# Patient Record
Sex: FEMALE | Race: WHITE | NOT HISPANIC OR LATINO | Employment: FULL TIME | URBAN - METROPOLITAN AREA
[De-identification: names, ages, dates, MRNs, and addresses within clinical notes are randomized per-mention and may not be internally consistent; named-entity substitution may affect disease eponyms.]

---

## 2018-06-26 ENCOUNTER — OFFICE VISIT - HEALTHEAST (OUTPATIENT)
Dept: FAMILY MEDICINE | Facility: CLINIC | Age: 58
End: 2018-06-26

## 2018-06-26 ENCOUNTER — COMMUNICATION - HEALTHEAST (OUTPATIENT)
Dept: TELEHEALTH | Facility: CLINIC | Age: 58
End: 2018-06-26

## 2018-06-26 DIAGNOSIS — Z12.31 SCREENING MAMMOGRAM, ENCOUNTER FOR: ICD-10-CM

## 2018-06-26 DIAGNOSIS — Z00.00 ROUTINE GENERAL MEDICAL EXAMINATION AT A HEALTH CARE FACILITY: ICD-10-CM

## 2018-06-26 DIAGNOSIS — Z12.4 PAP SMEAR FOR CERVICAL CANCER SCREENING: ICD-10-CM

## 2018-06-26 DIAGNOSIS — Z23 NEED FOR VACCINATION: ICD-10-CM

## 2018-06-26 DIAGNOSIS — D50.9 IRON DEFICIENCY ANEMIA: ICD-10-CM

## 2018-06-26 DIAGNOSIS — Z98.84 HISTORY OF GASTRIC BYPASS: ICD-10-CM

## 2018-06-26 DIAGNOSIS — L65.9 HAIR LOSS: ICD-10-CM

## 2018-06-26 LAB
ALBUMIN SERPL-MCNC: 3.7 G/DL (ref 3.5–5)
ALP SERPL-CCNC: 56 U/L (ref 45–120)
ALT SERPL W P-5'-P-CCNC: 13 U/L (ref 0–45)
ANION GAP SERPL CALCULATED.3IONS-SCNC: 9 MMOL/L (ref 5–18)
AST SERPL W P-5'-P-CCNC: 18 U/L (ref 0–40)
BASOPHILS # BLD AUTO: 0 THOU/UL (ref 0–0.2)
BASOPHILS NFR BLD AUTO: 1 % (ref 0–2)
BILIRUB SERPL-MCNC: 0.4 MG/DL (ref 0–1)
BUN SERPL-MCNC: 9 MG/DL (ref 8–22)
CALCIUM SERPL-MCNC: 8.8 MG/DL (ref 8.5–10.5)
CHLORIDE BLD-SCNC: 110 MMOL/L (ref 98–107)
CHOLEST SERPL-MCNC: 129 MG/DL
CO2 SERPL-SCNC: 23 MMOL/L (ref 22–31)
CREAT SERPL-MCNC: 0.66 MG/DL (ref 0.6–1.1)
EOSINOPHIL COUNT (ABSOLUTE): 0 THOU/UL (ref 0–0.4)
EOSINOPHIL NFR BLD AUTO: 1 % (ref 0–6)
ERYTHROCYTE [DISTWIDTH] IN BLOOD BY AUTOMATED COUNT: 24.1 % (ref 11–14.5)
FASTING STATUS PATIENT QL REPORTED: YES
FOLATE SERPL-MCNC: 14.6 NG/ML
GFR SERPL CREATININE-BSD FRML MDRD: >60 ML/MIN/1.73M2
GLUCOSE BLD-MCNC: 79 MG/DL (ref 70–125)
HBA1C MFR BLD: 4.8 % (ref 3.5–6)
HCT VFR BLD AUTO: 25.6 % (ref 35–47)
HDLC SERPL-MCNC: 61 MG/DL
HGB BLD-MCNC: 6.2 G/DL (ref 12–16)
IRON SATN MFR SERPL: 2 % (ref 20–50)
IRON SERPL-MCNC: 9 UG/DL (ref 42–175)
LDLC SERPL CALC-MCNC: 58 MG/DL
LYMPHOCYTES # BLD AUTO: 0.9 THOU/UL (ref 0.8–4.4)
LYMPHOCYTES NFR BLD AUTO: 20 % (ref 20–40)
MCH RBC QN AUTO: 16.2 PG (ref 27–34)
MCHC RBC AUTO-ENTMCNC: 24.2 G/DL (ref 32–36)
MCV RBC AUTO: 67 FL (ref 80–100)
MONOCYTES # BLD AUTO: 0.2 THOU/UL (ref 0–0.9)
MONOCYTES NFR BLD AUTO: 4 % (ref 2–10)
OVALOCYTES: ABNORMAL
PLAT MORPH BLD: ABNORMAL
PLATELET # BLD AUTO: 615 THOU/UL (ref 140–440)
PMV BLD AUTO: 10.3 FL (ref 8.5–12.5)
POTASSIUM BLD-SCNC: 4.3 MMOL/L (ref 3.5–5)
PROT SERPL-MCNC: 6.2 G/DL (ref 6–8)
RBC # BLD AUTO: 3.82 MILL/UL (ref 3.8–5.4)
SODIUM SERPL-SCNC: 142 MMOL/L (ref 136–145)
TIBC SERPL-MCNC: 441 UG/DL (ref 313–563)
TOTAL NEUTROPHILS-ABS(DIFF): 3.4 THOU/UL (ref 2–7.7)
TOTAL NEUTROPHILS-REL(DIFF): 75 % (ref 50–70)
TRANSFERRIN SERPL-MCNC: 353 MG/DL (ref 212–360)
TRIGL SERPL-MCNC: 51 MG/DL
TSH SERPL DL<=0.005 MIU/L-ACNC: 2.11 UIU/ML (ref 0.3–5)
VIT B12 SERPL-MCNC: 306 PG/ML (ref 213–816)
WBC: 4.5 THOU/UL (ref 4–11)

## 2018-06-26 ASSESSMENT — MIFFLIN-ST. JEOR: SCORE: 1313.44

## 2018-06-26 NOTE — ASSESSMENT & PLAN NOTE
Patient has no symptoms consistent with acute blood loss. The patient had a gastric bypass in 2011.  She is taking a daily children's vitamin with iron.  She had a normal colonoscopy in 2012 and is not having any bowel symptoms.  I suspect her iron deficiency is related to diet and poor absorption.  We will start ferrous sulfate solution 300 mg daily.  Recheck with lab and office visit in 3 weeks.  If not improving, we will needto consider repeat colonoscopy.  May also need to consider IV iron if not improving.

## 2018-06-27 LAB
25(OH)D3 SERPL-MCNC: 29.8 NG/ML (ref 30–80)
25(OH)D3 SERPL-MCNC: 29.8 NG/ML (ref 30–80)
HPV SOURCE: NORMAL
HUMAN PAPILLOMA VIRUS 16 DNA: NEGATIVE
HUMAN PAPILLOMA VIRUS 18 DNA: NEGATIVE
HUMAN PAPILLOMA VIRUS FINAL DIAGNOSIS: NORMAL
HUMAN PAPILLOMA VIRUS OTHER HR: NEGATIVE
SPECIMEN DESCRIPTION: NORMAL

## 2018-06-29 ENCOUNTER — COMMUNICATION - HEALTHEAST (OUTPATIENT)
Dept: FAMILY MEDICINE | Facility: CLINIC | Age: 58
End: 2018-06-29

## 2018-06-29 DIAGNOSIS — D50.9 IRON DEFICIENCY ANEMIA: ICD-10-CM

## 2018-07-02 ENCOUNTER — HOSPITAL ENCOUNTER (OUTPATIENT)
Dept: MAMMOGRAPHY | Facility: CLINIC | Age: 58
Discharge: HOME | End: 2018-07-02
Attending: FAMILY MEDICINE

## 2018-07-02 DIAGNOSIS — Z12.31 SCREENING MAMMOGRAM, ENCOUNTER FOR: ICD-10-CM

## 2018-07-03 LAB
BKR LAB AP ABNORMAL BLEEDING: NO
BKR LAB AP BIRTH CONTROL/HORMONES: NORMAL
BKR LAB AP CERVICAL APPEARANCE: NORMAL
BKR LAB AP GYN ADEQUACY: NORMAL
BKR LAB AP GYN INTERPRETATION: NORMAL
BKR LAB AP HPV REFLEX: NORMAL
BKR LAB AP LMP: NORMAL
BKR LAB AP PATIENT STATUS: NORMAL
BKR LAB AP PREVIOUS ABNORMAL: NORMAL
BKR LAB AP PREVIOUS NORMAL: 2012
HIGH RISK?: NO
PATH REPORT.COMMENTS IMP SPEC: NORMAL
RESULT FLAG (HE HISTORICAL CONVERSION): NORMAL

## 2019-11-19 ENCOUNTER — COMMUNICATION - HEALTHEAST (OUTPATIENT)
Dept: FAMILY MEDICINE | Facility: CLINIC | Age: 59
End: 2019-11-19

## 2019-11-26 ENCOUNTER — OFFICE VISIT - HEALTHEAST (OUTPATIENT)
Dept: FAMILY MEDICINE | Facility: CLINIC | Age: 59
End: 2019-11-26

## 2019-11-26 DIAGNOSIS — Z98.84 HISTORY OF GASTRIC BYPASS: ICD-10-CM

## 2019-11-26 DIAGNOSIS — Z01.818 PREOP EXAMINATION: ICD-10-CM

## 2019-11-26 DIAGNOSIS — Z86.2 HISTORY OF IRON DEFICIENCY ANEMIA: ICD-10-CM

## 2019-11-26 LAB
ERYTHROCYTE [DISTWIDTH] IN BLOOD BY AUTOMATED COUNT: 17.1 % (ref 11–14.5)
HCT VFR BLD AUTO: 38.2 % (ref 35–47)
HGB BLD-MCNC: 11.9 G/DL (ref 12–16)
IRON SATN MFR SERPL: 4 % (ref 20–50)
IRON SERPL-MCNC: 17 UG/DL (ref 42–175)
MCH RBC QN AUTO: 21.8 PG (ref 27–34)
MCHC RBC AUTO-ENTMCNC: 31.1 G/DL (ref 32–36)
MCV RBC AUTO: 70 FL (ref 80–100)
PLATELET # BLD AUTO: 326 THOU/UL (ref 140–440)
PMV BLD AUTO: 8.1 FL (ref 7–10)
RBC # BLD AUTO: 5.45 MILL/UL (ref 3.8–5.4)
TIBC SERPL-MCNC: 392 UG/DL (ref 313–563)
TRANSFERRIN SERPL-MCNC: 314 MG/DL (ref 212–360)
WBC: 4 THOU/UL (ref 4–11)

## 2019-11-26 ASSESSMENT — MIFFLIN-ST. JEOR: SCORE: 1357.78

## 2019-11-26 NOTE — ASSESSMENT & PLAN NOTE
Hemoglobin much improved at 11.9. Patient cleared for surgery. Iron studies pending. Daily iron supplement recommended given history ofgastric bypass.

## 2019-11-27 LAB
ATRIAL RATE - MUSE: 64 BPM
DIASTOLIC BLOOD PRESSURE - MUSE: NORMAL
INTERPRETATION ECG - MUSE: NORMAL
P AXIS - MUSE: 47 DEGREES
PR INTERVAL - MUSE: 194 MS
QRS DURATION - MUSE: 82 MS
QT - MUSE: 432 MS
QTC - MUSE: 445 MS
R AXIS - MUSE: 33 DEGREES
SYSTOLIC BLOOD PRESSURE - MUSE: NORMAL
T AXIS - MUSE: 36 DEGREES
VENTRICULAR RATE- MUSE: 64 BPM

## 2021-01-28 ENCOUNTER — COMMUNICATION - HEALTHEAST (OUTPATIENT)
Dept: FAMILY MEDICINE | Facility: CLINIC | Age: 61
End: 2021-01-28

## 2021-03-04 ENCOUNTER — COMMUNICATION - HEALTHEAST (OUTPATIENT)
Dept: FAMILY MEDICINE | Facility: CLINIC | Age: 61
End: 2021-03-04

## 2021-03-04 DIAGNOSIS — Z20.822 ENCOUNTER FOR LABORATORY TESTING FOR COVID-19 VIRUS: ICD-10-CM

## 2021-03-08 ENCOUNTER — AMBULATORY - HEALTHEAST (OUTPATIENT)
Dept: FAMILY MEDICINE | Facility: CLINIC | Age: 61
End: 2021-03-08

## 2021-03-08 DIAGNOSIS — Z20.822 ENCOUNTER FOR LABORATORY TESTING FOR COVID-19 VIRUS: ICD-10-CM

## 2021-03-08 LAB
SARS-COV-2 PCR COMMENT: NORMAL
SARS-COV-2 RNA SPEC QL NAA+PROBE: NEGATIVE
SARS-COV-2 VIRUS SPECIMEN SOURCE: NORMAL

## 2021-03-09 ENCOUNTER — COMMUNICATION - HEALTHEAST (OUTPATIENT)
Dept: SCHEDULING | Facility: CLINIC | Age: 61
End: 2021-03-09

## 2021-06-01 VITALS — WEIGHT: 172 LBS | BODY MASS INDEX: 31.65 KG/M2 | HEIGHT: 62 IN

## 2021-06-03 NOTE — PROGRESS NOTES
Preoperative Exam    Scheduled Procedure: Skin removal along the belt line  Surgery Date:  12/11/19  Surgery Location: [facility] Fax: [phone]    Surgeon:  Dr. Shayan Osborne    Assessment/Plan:     Problem List Items Addressed This Visit     History of gastric bypass    Relevant Orders    Electrocardiogram Perform - Clinic (Completed)    History of iron deficiency anemia     Hemoglobin much improved at 11.9. Patient cleared for surgery. Iron studies pending. Daily iron supplement recommended given history of gastric bypass.         Relevant Orders    HM2(CBC w/o Differential) (Completed)    Iron and Transferrin Iron Binding Capacity      Other Visit Diagnoses     Preop examination    -  Primary    Relevant Orders    Electrocardiogram Perform - Clinic (Completed)            Surgical Procedure Risk: Intermediate (reported cardiac risk generally 1-5%)  Have you had prior anesthesia?: Yes  Have you or any family members had a previous anesthesia reaction:  No  Do you or any family members have a history of a clotting or bleeding disorder?: No  Cardiac Risk Assessment: no increased risk for major cardiac complications    APPROVAL GIVEN to proceed with proposed procedure, without further diagnostic evaluation    Functional Status: Independent  Patient plans to recover at home with family.     Subjective:      Evelin Arias is a 59 y.o. female who presents for a preoperative consultation.  The patient has no concerns today. She does have a history of iron deficiency anemia due to poor absorption and history of gastric bypass. She has not been taking her iron supplement.    All other systems reviewed and are negative, other than those listed in the HPI.    Pertinent History  Do you have difficulty breathing or chest pain after walking up a flight of stairs: No  History of obstructive sleep apnea: No  Steroid use in the last 6 months: No  Frequent Aspirin/NSAID use: No  Prior Blood Transfusion:  No  Prior Blood Transfusion Reaction: No  If for some reason prior to, during or after the procedure, if it is medically indicated, would you be willing to have a blood transfusion?:  There is no transfusion refusal.    Current Outpatient Medications   Medication Sig Dispense Refill     pediatric multivitamin (FLINTSTONES) Chew chewable tablet Chew 1 tablet daily.       No current facility-administered medications for this visit.         No Known Allergies    Patient Active Problem List   Diagnosis     Recurrent cellulitis     History of gastric bypass     History of iron deficiency anemia     Vitamin D insufficiency     Diverticulosis of large intestine       History reviewed. No pertinent past medical history.    Past Surgical History:   Procedure Laterality Date     GASTRIC BYPASS  2012     LAPAROSCOPIC CHOLECYSTECTOMY  2010       Social History     Socioeconomic History     Marital status:      Spouse name: Not on file     Number of children: Not on file     Years of education: Not on file     Highest education level: Not on file   Occupational History     Not on file   Social Needs     Financial resource strain: Not on file     Food insecurity:     Worry: Not on file     Inability: Not on file     Transportation needs:     Medical: Not on file     Non-medical: Not on file   Tobacco Use     Smoking status: Never Smoker     Smokeless tobacco: Never Used   Substance and Sexual Activity     Alcohol use: Yes     Alcohol/week: 1.0 standard drinks     Types: 1 Glasses of wine per week     Comment: 1 Glass of wine a week.     Drug use: No     Sexual activity: Yes     Partners: Male   Lifestyle     Physical activity:     Days per week: Not on file     Minutes per session: Not on file     Stress: Not on file   Relationships     Social connections:     Talks on phone: Not on file     Gets together: Not on file     Attends Buddhism service: Not on file     Active member of club or organization: Not on file      "Attends meetings of clubs or organizations: Not on file     Relationship status: Not on file     Intimate partner violence:     Fear of current or ex partner: Not on file     Emotionally abused: Not on file     Physically abused: Not on file     Forced sexual activity: Not on file   Other Topics Concern     Not on file   Social History Narrative     Not on file       Patient Care Team:  Regla Gtz MD as PCP - General (Family Medicine)  Regla Gtz MD as Assigned PCP          Objective:     Vitals:    11/26/19 0937   BP: 110/82   Pulse: 60   Temp: 98.1  F (36.7  C)   SpO2: 99%   Weight: 180 lb 14.4 oz (82.1 kg)   Height: 5' 2.25\" (1.581 m)         Physical Exam:  Physical Exam  Constitutional:       General: She is not in acute distress.     Appearance: She is well-developed.   HENT:      Right Ear: Tympanic membrane and ear canal normal.      Left Ear: Tympanic membrane and ear canal normal.   Eyes:      Conjunctiva/sclera: Conjunctivae normal.      Pupils: Pupils are equal, round, and reactive to light.   Neck:      Musculoskeletal: Normal range of motion and neck supple.      Thyroid: No thyromegaly.      Vascular: No carotid bruit.   Cardiovascular:      Rate and Rhythm: Normal rate and regular rhythm.      Heart sounds: No murmur.   Pulmonary:      Effort: Pulmonary effort is normal. No respiratory distress.      Breath sounds: Normal breath sounds. No decreased breath sounds, wheezing or rhonchi.   Abdominal:      General: Bowel sounds are normal. There is no distension.      Palpations: Abdomen is soft. Abdomen is not rigid.      Tenderness: There is no abdominal tenderness. There is no guarding or rebound.      Hernia: There is no hernia in the ventral area.   Lymphadenopathy:      Cervical: No cervical adenopathy.   Skin:     Findings: No rash.   Neurological:      Mental Status: She is alert and oriented to person, place, and time.      Cranial Nerves: No cranial nerve deficit.      Gait: " Gait normal.      Deep Tendon Reflexes:      Reflex Scores:       Bicep reflexes are 2+ on the right side and 2+ on the left side.       Patellar reflexes are 2+ on the right side and 2+ on the left side.  Psychiatric:         Behavior: Behavior normal.         Thought Content: Thought content normal.         Judgment: Judgment normal.         There are no Patient Instructions on file for this visit.      Labs:  Recent Results (from the past 24 hour(s))   Electrocardiogram Perform - Clinic    Collection Time: 11/26/19 10:01 AM   Result Value Ref Range    SYSTOLIC BLOOD PRESSURE      DIASTOLIC BLOOD PRESSURE      VENTRICULAR RATE 64 BPM    ATRIAL RATE 64 BPM    P-R INTERVAL 194 ms    QRS DURATION 82 ms    Q-T INTERVAL 432 ms    QTC CALCULATION (BEZET) 445 ms    P Axis 47 degrees    R AXIS 33 degrees    T AXIS 36 degrees    MUSE DIAGNOSIS       Normal sinus rhythm  Normal ECG  No previous ECGs available     HM2(CBC w/o Differential)    Collection Time: 11/26/19 10:07 AM   Result Value Ref Range    WBC 4.0 4.0 - 11.0 thou/uL    RBC 5.45 (H) 3.80 - 5.40 mill/uL    Hemoglobin 11.9 (L) 12.0 - 16.0 g/dL    Hematocrit 38.2 35.0 - 47.0 %    MCV 70 (L) 80 - 100 fL    MCH 21.8 (L) 27.0 - 34.0 pg    MCHC 31.1 (L) 32.0 - 36.0 g/dL    RDW 17.1 (H) 11.0 - 14.5 %    Platelets 326 140 - 440 thou/uL    MPV 8.1 7.0 - 10.0 fL       Immunization History   Administered Date(s) Administered     Hep A, Adult IM (19yr & older) 04/10/2009     Influenza, inj, historic,unspecified 11/01/2019     Influenza,seasonal quad, PF, =/> 6months 11/20/2017     Td, adult adsorbed, PF 06/26/2018     Tdap 09/19/2008     Typhoid, Inj, Inactive 04/10/2009           Electronically signed by Regla Gtz MD 11/26/19 9:39 AM

## 2021-06-04 VITALS
WEIGHT: 180.9 LBS | TEMPERATURE: 98.1 F | BODY MASS INDEX: 33.29 KG/M2 | SYSTOLIC BLOOD PRESSURE: 110 MMHG | HEIGHT: 62 IN | OXYGEN SATURATION: 99 % | DIASTOLIC BLOOD PRESSURE: 82 MMHG | HEART RATE: 60 BPM

## 2021-06-14 NOTE — TELEPHONE ENCOUNTER
We are working hard to begin vaccinating more people against COVID-19. Currently, we are only vaccinating individuals age 75 and older and Phase 1a workers - healthcare workers who are unable to do their job remotely. Vaccine availability is very limited.    If you are 75 or older, or a healthcare worker who is unable to do your job remotely, please log in to [employer] using this link to schedule an appointment.  If there are no appointments left, you will be unable to schedule and need to check back later.  If you are a healthcare worker, you will be asked to provide proof of employment at your appointment. If you cannot, you will be turned away.    Vaccine appointments are being added as they become available. Please check your [employer] account frequently for availability. If you have technical difficulty using [employer], call [phone] for assistance.    You can learn more about the [facility]'s phased approach to administering the vaccine, with details on each phase, [email or web address].      Aa vaccine supply increases and we are able to open appointments to more groups, we will share that information on our website [email or web address]. Check this website to stay up to date on COVID-19 vaccination information.    You may also call  or your clinic # [phone] and choose prompt to schedule covid vaccine. [facility] at [place] and [place] locations.    Thank you.

## 2021-06-16 PROBLEM — K57.30 DIVERTICULOSIS OF LARGE INTESTINE: Status: ACTIVE | Noted: 2018-07-18

## 2021-06-16 PROBLEM — Z98.84 HISTORY OF GASTRIC BYPASS: Status: ACTIVE | Noted: 2018-06-26

## 2021-06-16 PROBLEM — L03.90 RECURRENT CELLULITIS: Status: ACTIVE | Noted: 2018-06-26

## 2021-06-16 PROBLEM — E55.9 VITAMIN D INSUFFICIENCY: Status: ACTIVE | Noted: 2018-06-27

## 2021-06-16 PROBLEM — Z86.2 HISTORY OF IRON DEFICIENCY ANEMIA: Status: ACTIVE | Noted: 2018-06-26

## 2021-06-18 NOTE — PROGRESS NOTES
FEMALE PREVENTATIVE EXAM    Assessment and Plan:     Problem List Items Addressed This Visit     History of gastric bypass    Relevant Orders    Comprehensive Metabolic Panel (Completed)    Vitamin B12 (Completed)    Vitamin D, Total (25-Hydroxy)    Iron and Transferrin Iron Binding Capacity (Completed)    Folate, Serum (Completed)    Iron deficiency anemia     Patient has no symptoms consistent with acute blood loss. The patient had a gastric bypass in 2011.  She is taking a daily children's vitamin with iron.  She had a normal colonoscopy in 2012 and is not having any bowel symptoms.  I suspect her iron deficiency is related to diet and poor absorption.  We will start ferrous sulfate solution 300 mg daily.  Recheck with lab and office visit in 3 weeks.  If not improving, we will need to consider repeat colonoscopy.  May also need to consider IV iron if not improving.         Relevant Medications    ferrous sulfate 300 mg (60 mg iron)/5 mL syrup    Other Relevant Orders    HM1(CBC and Differential) (Completed)    Vitamin B12 (Completed)    Thyroid Stimulating Hormone (TSH) (Completed)    Folate, Serum (Completed)    HM1 (CBC with Diff) (Completed)    Manual Differential (Completed)      Other Visit Diagnoses     Routine general medical examination at a health care facility    -  Primary    Relevant Orders    Lipid [place] FASTING (Completed)    Glycosylated Hemoglobin A1c (Completed)    Comprehensive Metabolic Panel (Completed)    Pap smear for cervical cancer screening        Relevant Orders    Gynecologic Cytology (PAP Smear)    Screening mammogram, encounter for        Relevant Orders    Mammo Screening Bilateral    Hair loss        Need for vaccination        Relevant Orders    Td, Preservative Free (green label) (Completed)        Patient Instructions   I will notify you of lab results.  I will make further recommendations in terms of your anemia once I have other results available.      Subjective:   Chief  Complaint: Evelin Arias is an 58 y.o. female here for a preventative health visit.     HPI:  The patient is also concerned about possible anemia.  She went to give blood at the [facility] last week and was told that her Hgb was low.  She had gastric bypass.  She is taking a children's chewable vitamin daily.  She denies any unusual bleeding or bruising.  Colonoscopy was in 2012 and she reports it was normal.  No family history of colon cancer or polyps.  No changes in her stool.  She has rare menses, about 1 every 6 months and they are very light.  She reports that for a while she was not taking any of her vitamins but restarted those recently.  No shortness of breath or chest pain.  No lightheadedness.      She is also noticing some increased symptoms of tremor.  This has slowly increased over time.  Previous evaluation has been negative.  No other neurologic symptoms.  The patient has also noticed some slow diffuse hair loss.    Patient Active Problem List   Diagnosis     Recurrent cellulitis     History of gastric bypass     Iron deficiency anemia      History reviewed. No pertinent past medical history.   Past Surgical History:   Procedure Laterality Date     GASTRIC BYPASS  2012     LAPAROSCOPIC CHOLECYSTECTOMY  2010      No current outpatient prescriptions on file.      Review of Systems   Constitutional: Negative for activity change, appetite change, fever and unexpected weight change.   HENT: Negative for congestion, hearing loss and sore throat.    Eyes: Negative for discharge and visual disturbance.   Respiratory: Negative for cough, shortness of breath and wheezing.    Cardiovascular: Negative for chest pain, palpitations and leg swelling.   Gastrointestinal: Negative for abdominal pain, blood in stool, constipation, diarrhea and vomiting.   Endocrine: Negative for polydipsia and polyuria.   Genitourinary: Negative for decreased urine volume, difficulty urinating, dysuria, frequency, hematuria and  "urgency.   Musculoskeletal: Negative for arthralgias, gait problem and myalgias.   Skin: Negative for rash.   Allergic/Immunologic: Negative for immunocompromised state.   Neurological: Negative for weakness.   Hematological: Does not bruise/bleed easily.   Psychiatric/Behavioral: Negative for dysphoric mood and sleep disturbance. The patient is not nervous/anxious.        Objective:   Vital Signs:   /70 (Patient Site: Left Arm, Patient Position: Sitting, Cuff Size: Adult Regular)  Pulse 79  Temp 98.3  F (36.8  C) (Oral)   Resp 16  Ht 5' 2\" (1.575 m)  Wt 172 lb (78 kg)  SpO2 98%  Breastfeeding? No  BMI 31.46 kg/m2     PHYSICAL EXAM  Physical Exam   Constitutional: She is oriented to person, place, and time. She appears well-developed and well-nourished. No distress.   HENT:   Right Ear: Tympanic membrane and ear canal normal.   Left Ear: Tympanic membrane and ear canal normal.   Mouth/Throat: Oropharynx is clear and moist.   Eyes: Conjunctivae and EOM are normal. Pupils are equal, round, and reactive to light.   Neck: Normal range of motion. Neck supple. No thyromegaly present.   Cardiovascular: Normal rate and regular rhythm.    No murmur heard.  Pulmonary/Chest: Effort normal and breath sounds normal. No respiratory distress. She has no wheezes. She has no rhonchi. Right breast exhibits no inverted nipple, no mass and no skin change. Left breast exhibits no inverted nipple, no mass and no skin change.   Abdominal: Soft. Bowel sounds are normal. She exhibits no distension and no mass. There is no hepatosplenomegaly. There is no tenderness. Hernia confirmed negative in the ventral area.   Genitourinary: Vagina normal and uterus normal. Cervix exhibits no discharge and no friability. Right adnexum displays no mass. Left adnexum displays no mass.   Lymphadenopathy:     She has no cervical adenopathy.   Neurological: She is alert and oriented to person, place, and time. No cranial nerve deficit. Gait " normal.   Reflex Scores:       Patellar reflexes are 2+ on the right side and 2+ on the left side.  Skin: Skin is warm. No bruising, no petechiae and no rash noted.   Psychiatric: She has a normal mood and affect. Her behavior is normal. Judgment and thought content normal.      I have had an Advance Directives discussion with the patient.

## 2021-08-22 ENCOUNTER — HEALTH MAINTENANCE LETTER (OUTPATIENT)
Age: 61
End: 2021-08-22

## 2021-10-17 ENCOUNTER — HEALTH MAINTENANCE LETTER (OUTPATIENT)
Age: 61
End: 2021-10-17

## 2022-08-26 ENCOUNTER — ANCILLARY PROCEDURE (OUTPATIENT)
Dept: MAMMOGRAPHY | Facility: CLINIC | Age: 62
End: 2022-08-26
Attending: FAMILY MEDICINE
Payer: OTHER GOVERNMENT

## 2022-08-26 DIAGNOSIS — Z12.31 VISIT FOR SCREENING MAMMOGRAM: ICD-10-CM

## 2022-08-26 PROCEDURE — 77067 SCR MAMMO BI INCL CAD: CPT

## 2022-10-02 ENCOUNTER — HEALTH MAINTENANCE LETTER (OUTPATIENT)
Age: 62
End: 2022-10-02

## 2022-10-17 ENCOUNTER — OFFICE VISIT (OUTPATIENT)
Dept: FAMILY MEDICINE | Facility: CLINIC | Age: 62
End: 2022-10-17
Payer: OTHER GOVERNMENT

## 2022-10-17 VITALS
DIASTOLIC BLOOD PRESSURE: 72 MMHG | SYSTOLIC BLOOD PRESSURE: 118 MMHG | WEIGHT: 209.9 LBS | HEIGHT: 62 IN | BODY MASS INDEX: 38.63 KG/M2 | OXYGEN SATURATION: 99 % | HEART RATE: 71 BPM

## 2022-10-17 DIAGNOSIS — Z11.59 NEED FOR HEPATITIS C SCREENING TEST: ICD-10-CM

## 2022-10-17 DIAGNOSIS — Z00.00 ROUTINE GENERAL MEDICAL EXAMINATION AT A HEALTH CARE FACILITY: Primary | ICD-10-CM

## 2022-10-17 DIAGNOSIS — Z86.2 HISTORY OF IRON DEFICIENCY ANEMIA: ICD-10-CM

## 2022-10-17 DIAGNOSIS — E66.812 CLASS 2 OBESITY WITHOUT SERIOUS COMORBIDITY WITH BODY MASS INDEX (BMI) OF 39.0 TO 39.9 IN ADULT, UNSPECIFIED OBESITY TYPE: ICD-10-CM

## 2022-10-17 DIAGNOSIS — Z11.4 SCREENING FOR HIV (HUMAN IMMUNODEFICIENCY VIRUS): ICD-10-CM

## 2022-10-17 DIAGNOSIS — Z98.84 HISTORY OF GASTRIC BYPASS: ICD-10-CM

## 2022-10-17 LAB
CHOLEST SERPL-MCNC: 171 MG/DL
ERYTHROCYTE [DISTWIDTH] IN BLOOD BY AUTOMATED COUNT: 17.3 % (ref 10–15)
HBA1C MFR BLD: 5.4 % (ref 0–5.6)
HCT VFR BLD AUTO: 35.8 % (ref 35–47)
HDLC SERPL-MCNC: 63 MG/DL
HGB BLD-MCNC: 10.6 G/DL (ref 11.7–15.7)
LDLC SERPL CALC-MCNC: 81 MG/DL
MCH RBC QN AUTO: 22 PG (ref 26.5–33)
MCHC RBC AUTO-ENTMCNC: 29.6 G/DL (ref 31.5–36.5)
MCV RBC AUTO: 74 FL (ref 78–100)
NONHDLC SERPL-MCNC: 108 MG/DL
PLATELET # BLD AUTO: 323 10E3/UL (ref 150–450)
RBC # BLD AUTO: 4.81 10E6/UL (ref 3.8–5.2)
TRIGL SERPL-MCNC: 137 MG/DL
TSH SERPL DL<=0.005 MIU/L-ACNC: 2.38 UIU/ML (ref 0.3–4.2)
VIT B12 SERPL-MCNC: 244 PG/ML (ref 232–1245)
WBC # BLD AUTO: 5.4 10E3/UL (ref 4–11)

## 2022-10-17 PROCEDURE — 99214 OFFICE O/P EST MOD 30 MIN: CPT | Mod: 25 | Performed by: FAMILY MEDICINE

## 2022-10-17 PROCEDURE — 36415 COLL VENOUS BLD VENIPUNCTURE: CPT | Performed by: FAMILY MEDICINE

## 2022-10-17 PROCEDURE — 87389 HIV-1 AG W/HIV-1&-2 AB AG IA: CPT | Performed by: FAMILY MEDICINE

## 2022-10-17 PROCEDURE — 84443 ASSAY THYROID STIM HORMONE: CPT | Performed by: FAMILY MEDICINE

## 2022-10-17 PROCEDURE — 85027 COMPLETE CBC AUTOMATED: CPT | Performed by: FAMILY MEDICINE

## 2022-10-17 PROCEDURE — 83036 HEMOGLOBIN GLYCOSYLATED A1C: CPT | Performed by: FAMILY MEDICINE

## 2022-10-17 PROCEDURE — 80061 LIPID PANEL: CPT | Performed by: FAMILY MEDICINE

## 2022-10-17 PROCEDURE — 99396 PREV VISIT EST AGE 40-64: CPT | Performed by: FAMILY MEDICINE

## 2022-10-17 PROCEDURE — 82306 VITAMIN D 25 HYDROXY: CPT | Performed by: FAMILY MEDICINE

## 2022-10-17 PROCEDURE — 82607 VITAMIN B-12: CPT | Performed by: FAMILY MEDICINE

## 2022-10-17 PROCEDURE — 86803 HEPATITIS C AB TEST: CPT | Performed by: FAMILY MEDICINE

## 2022-10-17 PROCEDURE — 80053 COMPREHEN METABOLIC PANEL: CPT | Performed by: FAMILY MEDICINE

## 2022-10-17 RX ORDER — SEMAGLUTIDE 0.25 MG/.5ML
0.25 INJECTION, SOLUTION SUBCUTANEOUS WEEKLY
Qty: 2 ML | Refills: 0 | Status: SHIPPED | OUTPATIENT
Start: 2022-10-17 | End: 2022-11-01

## 2022-10-17 ASSESSMENT — ENCOUNTER SYMPTOMS
NAUSEA: 0
CHILLS: 0
DIARRHEA: 0
SORE THROAT: 0
HEMATURIA: 0
FEVER: 0
BREAST MASS: 0
ABDOMINAL PAIN: 0
JOINT SWELLING: 0
NERVOUS/ANXIOUS: 0
ARTHRALGIAS: 0
HEMATOCHEZIA: 0
DIZZINESS: 0
PALPITATIONS: 0
HEARTBURN: 0
FREQUENCY: 0
COUGH: 0
HEADACHES: 0
PARESTHESIAS: 0
WEAKNESS: 0
CONSTIPATION: 0
SHORTNESS OF BREATH: 0
MYALGIAS: 0
EYE PAIN: 0
DYSURIA: 0

## 2022-10-17 NOTE — PROGRESS NOTES
SUBJECTIVE:   CC: Evelin is an 62 year old who presents for preventive health visit.     HPI: Evelin is a 62-year-old female presenting today as a new patient to me as well as for an annual physical exam.  The patient overall is doing reasonably well although is concerned about weight regain.  She has a history of gastric bypass surgery and was very successful in maintaining her weight up until COVID hit.  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  She is passionate around her job but it is a stressful month and has been extremely difficult over COVID.  She used to have a very good exercise routine but with the movement COVID has not sustained that routine.  She also has gotten into worse eating habits and has a resulting weight gain.  She is interested and motivated to get back on track with her weight management.      Patient has been advised of split billing requirements and indicates understanding: Yes  Healthy Habits:     Getting at least 3 servings of Calcium per day:  Yes    Bi-annual eye exam:  NO    Dental care twice a year:  Yes    Sleep apnea or symptoms of sleep apnea:  None    Diet:  Regular (no restrictions)    Frequency of exercise:  1 day/week    Duration of exercise:  15-30 minutes    Taking medications regularly:  Yes    Medication side effects:  Not applicable    PHQ-2 Total Score: 0    Additional concerns today:  Yes          Today's PHQ-2 Score:   PHQ-2 ( 1999 Pfizer) 10/17/2022   Q1: Little interest or pleasure in doing things 0   Q2: Feeling down, depressed or hopeless 0   PHQ-2 Score 0   Q1: Little interest or pleasure in doing things Not at all   Q2: Feeling down, depressed or hopeless Not at all   PHQ-2 Score 0       Abuse: Current or Past (Physical, Sexual or Emotional) - no  Do you feel safe in your environment? Yes        Social History     Tobacco Use      Smoking status: Never     Smokeless tobacco: Never   Substance Use Topics     Alcohol use: Yes     Alcohol/week: 1.0 standard drink     Comment: Alcoholic Drinks/day: 1 Glass of wine a week.         Alcohol Use 10/17/2022   Prescreen: >3 drinks/day or >7 drinks/week? No       Reviewed orders with patient.  Reviewed health maintenance and updated orders accordingly - Yes  Patient Active Problem List   Diagnosis     Recurrent cellulitis     History of gastric bypass     History of iron deficiency anemia     Vitamin D insufficiency     Diverticulosis of large intestine     Class 2 obesity without serious comorbidity with body mass index (BMI) of 39.0 to 39.9 in adult, unspecified obesity type     Past Surgical History:   Procedure Laterality Date     GASTRIC BYPASS  2012     LAPAROSCOPIC CHOLECYSTECTOMY  2010       Social History     Tobacco Use     Smoking status: Never     Smokeless tobacco: Never   Substance Use Topics     Alcohol use: Yes     Alcohol/week: 1.0 standard drink     Comment: Alcoholic Drinks/day: 1 Glass of wine a week.     Family History   Problem Relation Age of Onset     Heart Disease Mother      Diabetes Mother      Obesity Mother      Mental Illness Mother      Sarcoidosis Mother      Other - See Comments Mother         bleeding disorder     Hypertension Mother      Hyperlipidemia Mother      Substance Abuse Mother         Alcohol     Dementia Father      Heart Disease Father         MI in 30s     Alcoholism Father      Cerebrovascular Disease Father      Hypertension Father      Substance Abuse Father         Alcohol     Cerebrovascular Disease Sister      No Known Problems Brother      Alcoholism Sister      No Known Problems Son      Obesity Son      Mental Illness Son      Developmental delay Daughter      Seizure Disorder Daughter      Other Cancer Paternal Grandmother         eye     Depression Son      Anxiety Disorder Son      Mental Illness Son      Obesity Son      Substance Abuse  Sister         Alchohol         Current Outpatient Medications   Medication Sig Dispense Refill     Semaglutide-Weight Management (WEGOVY) 0.25 MG/0.5ML SOAJ Inject 0.25 mg Subcutaneous once a week 2 mL 0     No Known Allergies    Breast Cancer Screening:    Breast CA Risk Assessment (FHS-7) 10/17/2022   Do you have a family history of breast, colon, or ovarian cancer? No / Unknown         Mammogram Screening: Recommended mammography every 1-2 years with patient discussion and risk factor consideration  Pertinent mammograms are reviewed under the imaging tab.    History of abnormal Pap smear: NO - age 30-65 PAP every 5 years with negative HPV co-testing recommended  PAP / HPV Latest Ref Rng & Units 6/26/2018   PAP - Negative for squamous intraepithelial lesion or malignancy  Electronically signed by Dasha Rea CT (ASCP) on 7/3/2018 at 10:58 AM     HPV16 NEG Negative   HPV18 NEG Negative   HRHPV NEG Negative     Reviewed and updated as needed this visit by clinical staff   Tobacco  Allergies               Reviewed and updated as needed this visit by Provider                     Review of Systems   Constitutional: Negative for chills and fever.   HENT: Negative for congestion, ear pain, hearing loss and sore throat.    Eyes: Negative for pain and visual disturbance.   Respiratory: Negative for cough and shortness of breath.    Cardiovascular: Negative for chest pain, palpitations and peripheral edema.   Gastrointestinal: Negative for abdominal pain, constipation, diarrhea, heartburn, hematochezia and nausea.   Breasts:  Negative for tenderness, breast mass and discharge.   Genitourinary: Negative for dysuria, frequency, genital sores, hematuria, pelvic pain, urgency, vaginal bleeding and vaginal discharge.   Musculoskeletal: Negative for arthralgias, joint swelling and myalgias.   Skin: Negative for rash.   Neurological: Negative for dizziness, weakness, headaches and paresthesias.   Psychiatric/Behavioral:  "Negative for mood changes. The patient is not nervous/anxious.           OBJECTIVE:   /72 (BP Location: Left arm, Patient Position: Left side, Cuff Size: Adult Large)   Pulse 71   Ht 1.562 m (5' 1.5\")   Wt 95.2 kg (209 lb 14.4 oz)   SpO2 99%   BMI 39.02 kg/m    Physical Exam  GENERAL APPEARANCE: healthy, alert and no distress  EYES: Eyes grossly normal to inspection, PERRL and conjunctivae and sclerae normal  HENT: ear canals and TM's normal, nose and mouth without ulcers or lesions, oropharynx clear and oral mucous membranes moist  NECK: no adenopathy, no asymmetry, masses, or scars and thyroid normal to palpation  RESP: lungs clear to auscultation - no rales, rhonchi or wheezes  BREAST: normal without masses, tenderness or nipple discharge and no palpable axillary masses or adenopathy  CV: regular rate and rhythm, normal S1 S2, no S3 or S4, no murmur, click or rub, no peripheral edema and peripheral pulses strong  ABDOMEN: soft, nontender, no hepatosplenomegaly, no masses and bowel sounds normal  MS: no musculoskeletal defects are noted and gait is age appropriate without ataxia  SKIN: no suspicious lesions or rashes  NEURO: Normal strength and tone, sensory exam grossly normal, mentation intact and speech normal  PSYCH: mentation appears normal and affect normal/bright    Labs reviewed in Epic    ASSESSMENT/PLAN:     Problem List Items Addressed This Visit        Digestive    Class 2 obesity without serious comorbidity with body mass index (BMI) of 39.0 to 39.9 in adult, unspecified obesity type     The patient has had some fairly significant weight regain that occurred over the past couple of years due in part to her move, stressful job and COVID.  The patient is quite knowledgeable as it relates to nutrition although has been relying on eating out much more often.  We talked about getting back on track with nutrition as well as the importance of getting back into a good exercise routine.  Ultimately, " a GLP-1 agonist in the form of Saxenda was prescribed with recommendations for follow-up in 6 to 8 weeks.         Relevant Medications    Semaglutide-Weight Management (WEGOVY) 0.25 MG/0.5ML SOAJ    Other Relevant Orders    TSH with free T4 reflex (Completed)    Hemoglobin A1c (Completed)       Other    History of gastric bypass     It has been a while since the patient has had updated lab work related to bypass surgery including checking for various vitamins and a CBC.  She has not been taking any supplements recently and I will provide her with some specific recommendations once I receive her lab work back.  She does note constipation and GI upset with iron supplementation.         Relevant Orders    Vitamin B12 (Completed)    CBC with platelets (Completed)    Lipid Profile (Chol, Trig, HDL, LDL calc) (Completed)    Comprehensive metabolic panel (BMP + Alb, Alk Phos, ALT, AST, Total. Bili, TP) (Completed)    Vitamin D Deficiency    History of iron deficiency anemia     The patient has a history of iron deficiency anemia although states she has not been taking any iron recently as it causes some GI side effects.        Other Visit Diagnoses     Routine general medical examination at a health care facility    -  Primary    Screening for HIV (human immunodeficiency virus)        Relevant Orders    HIV Antigen Antibody Combo    Need for hepatitis C screening test        Relevant Orders    Hepatitis C Screen Reflex to HCV RNA Quant and Genotype          COUNSELING:  Reviewed preventive health counseling, as reflected in patient instructions       Regular exercise       Healthy diet/nutrition       Osteoporosis prevention/bone health       Colorectal Cancer Screening       Consider Hep C screening for all patients one time for ages 18-79 years       HIV screeninx in teen years, 1x in adult years, and at intervals if high risk    Estimated body mass index is 39.02 kg/m  as calculated from the following:    Height as  "of this encounter: 1.562 m (5' 1.5\").    Weight as of this encounter: 95.2 kg (209 lb 14.4 oz).    Weight management plan: Discussed healthy diet and exercise guidelines    She reports that she has never smoked. She has never used smokeless tobacco.      Counseling Resources:  ATP IV Guidelines  Pooled Cohorts Equation Calculator  Breast Cancer Risk Calculator  BRCA-Related Cancer Risk Assessment: FHS-7 Tool  FRAX Risk Assessment  ICSI Preventive Guidelines  Dietary Guidelines for Americans, 2010  USDA's MyPlate  ASA Prophylaxis  Lung CA Screening    TATY CALIXTO  [facility]  "

## 2022-10-17 NOTE — ASSESSMENT & PLAN NOTE
It has been a while since the patient has had updated lab work related to bypass surgery including checking for various vitamins and a CBC.  She has not been taking any supplements recently and I will provide her with some specific recommendations once I receive her lab work back.  She does note constipation and GI upset with iron supplementation.

## 2022-10-18 ENCOUNTER — TELEPHONE (OUTPATIENT)
Dept: FAMILY MEDICINE | Facility: CLINIC | Age: 62
End: 2022-10-18

## 2022-10-18 PROBLEM — E66.812 CLASS 2 OBESITY WITHOUT SERIOUS COMORBIDITY WITH BODY MASS INDEX (BMI) OF 39.0 TO 39.9 IN ADULT, UNSPECIFIED OBESITY TYPE: Status: ACTIVE | Noted: 2022-10-18

## 2022-10-18 LAB
ALBUMIN SERPL BCG-MCNC: 4.3 G/DL (ref 3.5–5.2)
ALP SERPL-CCNC: 65 U/L (ref 35–104)
ALT SERPL W P-5'-P-CCNC: 17 U/L (ref 10–35)
ANION GAP SERPL CALCULATED.3IONS-SCNC: 13 MMOL/L (ref 7–15)
AST SERPL W P-5'-P-CCNC: 26 U/L (ref 10–35)
BILIRUB SERPL-MCNC: 0.3 MG/DL
BUN SERPL-MCNC: 13.6 MG/DL (ref 8–23)
CALCIUM SERPL-MCNC: 9.5 MG/DL (ref 8.8–10.2)
CHLORIDE SERPL-SCNC: 104 MMOL/L (ref 98–107)
CREAT SERPL-MCNC: 0.7 MG/DL (ref 0.51–0.95)
DEPRECATED CALCIDIOL+CALCIFEROL SERPL-MC: 40 UG/L (ref 20–75)
DEPRECATED HCO3 PLAS-SCNC: 23 MMOL/L (ref 22–29)
GFR SERPL CREATININE-BSD FRML MDRD: >90 ML/MIN/1.73M2
GLUCOSE SERPL-MCNC: 84 MG/DL (ref 70–99)
HCV AB SERPL QL IA: NONREACTIVE
HIV 1+2 AB+HIV1 P24 AG SERPL QL IA: NONREACTIVE
POTASSIUM SERPL-SCNC: 4 MMOL/L (ref 3.4–5.3)
PROT SERPL-MCNC: 6.7 G/DL (ref 6.4–8.3)
SODIUM SERPL-SCNC: 140 MMOL/L (ref 136–145)

## 2022-10-18 NOTE — TELEPHONE ENCOUNTER
Prior Authorization Request  Who s requesting:  Pharmacy  Ph[facility] Location[id number] [address] AT [facility] [address]  Medication Name: Semaglutide-Weight Management (WEGOVY) 0.25 MG/0.5ML SOAJ  Insurance Plan:  WEST  Insurance Member ID Number:  [id number]  CoverMyMeds Key: [id number]  Informed patient that prior authorizations can take up to 10 business days for response:   NA  Okay to leave a detailed message: No     Route to pool:  DEVYN SANCHEZ MED

## 2022-10-18 NOTE — ASSESSMENT & PLAN NOTE
The patient has a history of iron deficiency anemia although states she has not been taking any iron recently as it causes some GI side effects.

## 2022-10-18 NOTE — ASSESSMENT & PLAN NOTE
The patient has had some fairly significant weight regain that occurred over the past couple of years due in part to her move, stressful job and COVID.  The patient is quite knowledgeable as it relates to nutrition although has been relying on eating out much more often.  We talked about getting back on track with nutrition as well as the importance of getting back into a good exercise routine.  Ultimately, a GLP-1 agonist in the form of Saxenda was prescribed with recommendations for follow-up in 6 to 8 weeks.

## 2022-10-19 NOTE — TELEPHONE ENCOUNTER
Central Prior Authorization Team   Phone: [phone]    PA Initiation    Medication: Wegovy 0.25MG/0.5ML auto-injectors  Insurance Company: [employer] - Phone [phone] Fax [phone]  Pharmacy Filling the Rx: [email or web address] #[id number] [place], [place] - [address] AT [facility] [address]  Filling Pharmacy Phone: [phone]  Filling Pharmacy Fax:    Start Date: 10/19/2022

## 2022-10-24 NOTE — TELEPHONE ENCOUNTER
PRIOR AUTHORIZATION DENIED    Medication: Wegovy 0.25MG/0.5ML auto-injectors    Denial Date: 10/24/2022    Denial Rational: Patient needs to try and fail alternatives listed below:       Appeal Information:  If provider would like to appeal please provide a letter of medical necessity and route back to PA team.

## 2022-10-28 ENCOUNTER — MYC MEDICAL ADVICE (OUTPATIENT)
Dept: FAMILY MEDICINE | Facility: CLINIC | Age: 62
End: 2022-10-28

## 2022-10-28 NOTE — TELEPHONE ENCOUNTER
Spoke with patient. Explained that if she needs yellow fever or japanese encephalitis vaccinations she would need to get those done at a travel clinic, which would require her to pay for an additional office visit besides her one with PCP.     She stated she will check with the travel clinic and find out their availability and will call back when she knows more.

## 2022-11-01 ENCOUNTER — MYC MEDICAL ADVICE (OUTPATIENT)
Dept: FAMILY MEDICINE | Facility: CLINIC | Age: 62
End: 2022-11-01

## 2022-11-01 DIAGNOSIS — E66.812 CLASS 2 OBESITY WITHOUT SERIOUS COMORBIDITY WITH BODY MASS INDEX (BMI) OF 39.0 TO 39.9 IN ADULT, UNSPECIFIED OBESITY TYPE: Primary | ICD-10-CM

## 2022-11-02 ENCOUNTER — OFFICE VISIT (OUTPATIENT)
Dept: FAMILY MEDICINE | Facility: CLINIC | Age: 62
End: 2022-11-02
Payer: OTHER GOVERNMENT

## 2022-11-02 ENCOUNTER — TELEPHONE (OUTPATIENT)
Dept: FAMILY MEDICINE | Facility: CLINIC | Age: 62
End: 2022-11-02

## 2022-11-02 VITALS
RESPIRATION RATE: 14 BRPM | DIASTOLIC BLOOD PRESSURE: 74 MMHG | SYSTOLIC BLOOD PRESSURE: 110 MMHG | OXYGEN SATURATION: 99 % | HEART RATE: 78 BPM | TEMPERATURE: 97.9 F | WEIGHT: 199 LBS | BODY MASS INDEX: 36.99 KG/M2

## 2022-11-02 DIAGNOSIS — Z71.84 ENCOUNTER FOR COUNSELING FOR TRAVEL: Primary | ICD-10-CM

## 2022-11-02 DIAGNOSIS — Z23 NEED FOR IMMUNIZATION AGAINST TYPHOID: ICD-10-CM

## 2022-11-02 DIAGNOSIS — E66.812 CLASS 2 OBESITY WITHOUT SERIOUS COMORBIDITY WITH BODY MASS INDEX (BMI) OF 39.0 TO 39.9 IN ADULT, UNSPECIFIED OBESITY TYPE: Primary | ICD-10-CM

## 2022-11-02 PROCEDURE — 90471 IMMUNIZATION ADMIN: CPT

## 2022-11-02 PROCEDURE — 90691 TYPHOID VACCINE IM: CPT

## 2022-11-02 PROCEDURE — 99401 PREV MED CNSL INDIV APPRX 15: CPT | Mod: 25 | Performed by: PHYSICIAN ASSISTANT

## 2022-11-02 RX ORDER — AZITHROMYCIN 500 MG/1
TABLET, FILM COATED ORAL
Qty: 6 TABLET | Refills: 0 | Status: SHIPPED | OUTPATIENT
Start: 2022-11-02 | End: 2023-03-13

## 2022-11-02 RX ORDER — PHENTERMINE AND TOPIRAMATE 3.75; 23 MG/1; MG/1
CAPSULE, EXTENDED RELEASE ORAL
Qty: 60 CAPSULE | Refills: 0 | Status: SHIPPED | OUTPATIENT
Start: 2022-11-02 | End: 2022-11-30

## 2022-11-02 NOTE — PROGRESS NOTES
SUBJECTIVE: Evelin Arias , a 62 year old  female, presents for counseling and information regarding upcoming travel to [facility] and [place]. Special medical concerns include: none. She anticipates the following unusual exposures: none.    [place]    Departure Date: 1/11/23 Return date: 1/20/23 [place] [facility]    Departure Date: 1/30/23 Return date: 2/12/23 [place] Business trip    Visiting an urban or rural area?: urban    Accommodations (i.e. hotel, hostel, friends, family, etc): hotel and family    Women - First day of your last period: Na    IMMUNIZATION HISTORY  Have you received any vaccinations in the past 4 weeks?  Yes  Have you ever fainted from having your blood drawn or from an injection?  No  Have you ever had a fever reaction to vaccination?  No  Have you ever had any bad reaction or side effect from any vaccination?  No  Have you ever had hepatitis A or B vaccine?  Yes  Do you live (or work closely) with anyone who has AIDS, an AIDS-like condition, any other immune disorder or who is on chemotherapy for cancer?  No  Have you received any injection of immune globulin or any blood products during the past 12 months?  No    GENERAL MEDICAL HISTORY  Do you have a medical condition that warrants maintenance medication or physician follow-up?  No  Do you have a medical condition that is stable now, but that may recur while traveling?  No  Has your spleen been removed?  No  Have you had an acute illness or a fever in the past 48 hours?  No  Are you pregnant, or might you become pregnant on this trip?  Any chance of pregnancy?  No  Are you breastfeeding?  No  Do you have HIV, AIDS, an AIDS-like condition, any other immune disorder, leukemia or cancer?  No  Do you have a severe combined immunodeficiency disease?  No  Have you had your thymus gland removed or history of problems with your thymus, such as myasthenia gravis, DiGeorge syndrome, or  thymoma?  No    Do you have severe thrombocytopenia (low platelet count) or a coagulation disorder?  No  Have you ever had a convulsion, seizure, epilepsy, neurologic condition or brain infection?  No  Do you have any stomach conditions?  No  Do you have a G6PD deficiency?  No  Do you have severe renal or kidney impairment?  No  Do you have a history of psychiatric problems?  No  Do you have a problem with strange dreams and/or nightmares?  No  Do you have insomnia?  No  Do you have problems with vaginitis?  No  Do you have psoriasis?  No  Are you prone to motion sickness?  No  Have you ever had headaches, nausea, vomiting, or breathing problems from altitude exposure?  No      History reviewed. No pertinent past medical history.   Immunization History   Administered Date(s) Administered     COVID-19,PF,Moderna 02/16/2021, 03/16/2021, 11/12/2021, 04/01/2022     Flu, Unspecified 11/01/2019     HepA-Adult 04/10/2009, 11/01/2018     Influenza Vaccine IM > 6 months Valent IIV4 (Alfuria,Fluzone) 11/20/2017, 11/01/2018, 10/25/2019     Influenza Vaccine Im 4yrs+ 4 Valent CCIIV4 09/22/2021     Influenza Vaccine, 6+MO IM (QUADRIVALENT W/PRESERVATIVES) 10/06/2020     TD (ADULT, 7+) 06/26/2018     Tdap (Adacel,Boostrix) 09/19/2008     Typhoid IM 04/10/2009     Zoster vaccine recombinant adjuvanted (SHINGRIX) 10/06/2020, 12/11/2020       Current Outpatient Medications   Medication Sig Dispense Refill     Phentermine-Topiramate (QSYMIA) 3.75-23 MG CP24 Take 3.75-23 mg by mouth every morning for 14 days, THEN 7.5-46 mg every morning for 14 days. 60 capsule 0     No Known Allergies     EXAM: deferred    Immunizations discussed include: Typhoid  Malaria prophylaxis recommended: not needed- [place] and [place]  Symptomatic treatment for traveler's diarrhea: bismuth subsalicylate, loperamide/diphenoxylate and azithromycin    ASSESSMENT/PLAN:    (Z71.84) Encounter for counseling for travel  (primary encounter diagnosis)    Comment:  Typhoid vaccines today. Patient will return or follow-up with PCP as needed. Prophylaxis given for Traveler's diarrhea and is not needed for Malaria. All questions were answered.     Plan: azithromycin (ZITHROMAX) 500 MG tablet            (Z23) Need for immunization against typhoid  Comment:   Plan: TYPHOID VACCINE, IM              I have reviewed general recommendations for safe travel   including: food/water precautions, insect avoidance, safe sex   practices given high prevalence of HIV and other STDs,   roadway safety. Educational materials and links to the [facility]   Traveler's health website have been provided.    Total time 15 minutes, greater than 50 percent in counseling   and coordination of care.

## 2022-11-02 NOTE — TELEPHONE ENCOUNTER
Prior Authorization Request  Who s requesting:  Pharmacy  Pharmacy Name and Location: [place] [address]  Medication Name: Phentermine-Topiramate (QSYMIA) 3.75-23 MG CP24  Insurance Plan: icix  Insurance Member ID Number:  [id number]  CoverMyMeds Key:   Informed patient that prior authorizations can take up to 10 business days for response:   NA  Okay to leave a detailed message: No     Route to pool:  DEVYN SANCHEZ MED

## 2022-11-02 NOTE — PATIENT INSTRUCTIONS
"See travel packet provided  Recommend ultrathon (mosquito repellant), pepto bismol and imodium  The food and drink choices you make while traveling can impact your likelihood of getting sick.   If you aren't sure if a food or drink is safe, the saying \" BOIL IT, COOK IT, PEEL IT, OR FORGET IT\" can help you decide whether it's okay to consume.   Also bring hand  and sun screen with you.  Safe Travels       Today November 2, 2022 you received the    Typhoid - injectable. This vaccine is valid for two years. .    These appointments can be made as a NURSE ONLY visit.    **It is very important for the vaccinations to be given on the scheduled day(s), this helps ensure you receive the full effectiveness of the vaccine.**    Please call [facility] with any questions [phone]    Thank you for visiting [facility]    "

## 2022-11-03 ENCOUNTER — MYC MEDICAL ADVICE (OUTPATIENT)
Dept: FAMILY MEDICINE | Facility: CLINIC | Age: 62
End: 2022-11-03

## 2022-11-03 NOTE — TELEPHONE ENCOUNTER
Central Prior Authorization Team   Phone: [phone]    PA Initiation    Medication: Qsymia 3.75-23MG er capsules  Insurance Company: [employer]s - Phone [phone] Fax [phone]  Pharmacy Filling the Rx: [facility] #[id number] [place], [place] - [address] AT [facility] OF [address]  Filling Pharmacy Phone: [phone]  Filling Pharmacy Fax:    Start Date: 11/3/2022

## 2022-11-09 RX ORDER — PHENTERMINE HYDROCHLORIDE 15 MG/1
15 CAPSULE ORAL EVERY MORNING
Qty: 30 CAPSULE | Refills: 0 | Status: SHIPPED | OUTPATIENT
Start: 2022-11-09 | End: 2022-12-12

## 2022-11-09 RX ORDER — TOPIRAMATE 25 MG/1
TABLET, FILM COATED ORAL
Qty: 49 TABLET | Refills: 0 | Status: SHIPPED | OUTPATIENT
Start: 2022-11-09 | End: 2022-12-06

## 2022-11-09 NOTE — TELEPHONE ENCOUNTER
PRIOR AUTHORIZATION DENIED    Medication: Qsymia 3.75-23MG er capsules    Denial Date: 11/9/2022    Denial Rational: Patient needs to try and fail phentermine alone        Appeal Information: Patient must request appeal from insurance. It can not be completed by PA team/provider

## 2022-11-10 ENCOUNTER — TELEPHONE (OUTPATIENT)
Dept: FAMILY MEDICINE | Facility: CLINIC | Age: 62
End: 2022-11-10

## 2022-11-10 NOTE — TELEPHONE ENCOUNTER
Prior Authorization Request  Who s requesting:  Pharmacy  Ph[id number] [address] AT [facility] [address]  Medication Name: phentermine (ADIPEX-P) 15 MG capsule  Insurance Plan:  WEST  Insurance Member ID Number:  [id number]  CoverMyMeds Key: [id number]  Informed patient that prior authorizations can take up to 10 business days for response:   Yes  Okay to leave a detailed message: No     Route to pool:  DEVYN SANCHEZ MED

## 2022-11-11 NOTE — TELEPHONE ENCOUNTER
Prior Authorization Approval    Authorization Effective Date: 10/12/2022  Authorization Expiration Date: 2/9/2023  Medication: Phentermine 15mg PA APPROVED  Approved Dose/Quantity: 30  Reference #:     Insurance Company: [employer] - Phone [phone] Fax [phone]  Expected CoPay:       CoPay Card Available:      Foundation Assistance Needed:    Which Pharmacy is filling the prescription (Not needed for infusion/clinic administered): [facility] #[id number] [place] [address] - [address] AT [address]  Pharmacy Notified: Yes  Patient Notified: Comment:  Pharmacy will notify patient.          PA Initiation    Medication: Phentermine 15mg PA INITIATED  Insurance Company: [employer] - Phone [phone] Fax [phone]  Pharmacy Filling the Rx: [facility] #[id number] [place], [place] - [address] AT [address]  Filling Pharmacy Phone: [phone]  Filling Pharmacy Fax:    Start Date: 11/11/2022    Central Prior Authorization Team   Phone: [phone]

## 2022-12-06 ENCOUNTER — MYC MEDICAL ADVICE (OUTPATIENT)
Dept: FAMILY MEDICINE | Facility: CLINIC | Age: 62
End: 2022-12-06

## 2022-12-06 DIAGNOSIS — E66.812 CLASS 2 OBESITY WITHOUT SERIOUS COMORBIDITY WITH BODY MASS INDEX (BMI) OF 39.0 TO 39.9 IN ADULT, UNSPECIFIED OBESITY TYPE: ICD-10-CM

## 2022-12-06 RX ORDER — PHENTERMINE HYDROCHLORIDE 15 MG/1
15 CAPSULE ORAL EVERY MORNING
Qty: 30 CAPSULE | Refills: 0 | OUTPATIENT
Start: 2022-12-06

## 2022-12-07 NOTE — TELEPHONE ENCOUNTER
Patient was prescribed Phentermine; needed to follow up but no appointment has been made. I need to monitor BP when on this med. Please reach out to patient regarding follow up. I did not fill her Phentermine at this time.

## 2022-12-12 ENCOUNTER — OFFICE VISIT (OUTPATIENT)
Dept: FAMILY MEDICINE | Facility: CLINIC | Age: 62
End: 2022-12-12
Payer: OTHER GOVERNMENT

## 2022-12-12 VITALS
DIASTOLIC BLOOD PRESSURE: 68 MMHG | WEIGHT: 189 LBS | BODY MASS INDEX: 35.13 KG/M2 | HEART RATE: 79 BPM | SYSTOLIC BLOOD PRESSURE: 118 MMHG | OXYGEN SATURATION: 100 %

## 2022-12-12 DIAGNOSIS — Z98.84 HISTORY OF GASTRIC BYPASS: ICD-10-CM

## 2022-12-12 DIAGNOSIS — D50.9 IRON DEFICIENCY ANEMIA, UNSPECIFIED IRON DEFICIENCY ANEMIA TYPE: ICD-10-CM

## 2022-12-12 DIAGNOSIS — E66.812 CLASS 2 OBESITY WITHOUT SERIOUS COMORBIDITY WITH BODY MASS INDEX (BMI) OF 39.0 TO 39.9 IN ADULT, UNSPECIFIED OBESITY TYPE: Primary | ICD-10-CM

## 2022-12-12 LAB
ERYTHROCYTE [DISTWIDTH] IN BLOOD BY AUTOMATED COUNT: 18.8 % (ref 10–15)
FERRITIN SERPL-MCNC: 12 NG/ML (ref 11–328)
HCT VFR BLD AUTO: 38.9 % (ref 35–47)
HGB BLD-MCNC: 11.8 G/DL (ref 11.7–15.7)
IRON BINDING CAPACITY (ROCHE): 349 UG/DL (ref 240–430)
IRON SATN MFR SERPL: 21 % (ref 15–46)
IRON SERPL-MCNC: 72 UG/DL (ref 37–145)
MCH RBC QN AUTO: 22.1 PG (ref 26.5–33)
MCHC RBC AUTO-ENTMCNC: 30.3 G/DL (ref 31.5–36.5)
MCV RBC AUTO: 73 FL (ref 78–100)
PLATELET # BLD AUTO: 292 10E3/UL (ref 150–450)
RBC # BLD AUTO: 5.33 10E6/UL (ref 3.8–5.2)
VIT B12 SERPL-MCNC: 409 PG/ML (ref 232–1245)
WBC # BLD AUTO: 4.7 10E3/UL (ref 4–11)

## 2022-12-12 PROCEDURE — 83550 IRON BINDING TEST: CPT | Performed by: FAMILY MEDICINE

## 2022-12-12 PROCEDURE — 83540 ASSAY OF IRON: CPT | Performed by: FAMILY MEDICINE

## 2022-12-12 PROCEDURE — 85027 COMPLETE CBC AUTOMATED: CPT | Performed by: FAMILY MEDICINE

## 2022-12-12 PROCEDURE — 99213 OFFICE O/P EST LOW 20 MIN: CPT | Performed by: FAMILY MEDICINE

## 2022-12-12 PROCEDURE — 82607 VITAMIN B-12: CPT | Performed by: FAMILY MEDICINE

## 2022-12-12 PROCEDURE — 36415 COLL VENOUS BLD VENIPUNCTURE: CPT | Performed by: FAMILY MEDICINE

## 2022-12-12 PROCEDURE — 82728 ASSAY OF FERRITIN: CPT | Performed by: FAMILY MEDICINE

## 2022-12-12 RX ORDER — PHENTERMINE HYDROCHLORIDE 15 MG/1
15 CAPSULE ORAL EVERY MORNING
Qty: 90 CAPSULE | Refills: 0 | Status: SHIPPED | OUTPATIENT
Start: 2022-12-12 | End: 2023-02-20

## 2022-12-12 NOTE — PROGRESS NOTES
Problem List Items Addressed This Visit        Digestive    Class 2 obesity without serious comorbidity with body mass index (BMI) of 39.0 to 39.9 in adult, unspecified obesity type - Primary     The patient has had a very successful initial weight loss with the supportive a combination of phentermine and topiramate.  She is tolerating both medications well without any side effects, with the exception of a slight dry mouth due to the phentermine.  I refilled the medication and will have her continue.  She feels she is making better choices and that it is helping curb her cravings.  She has been exercising regularly with her  as well.  Follow-up in 3 months.         Relevant Medications    phentermine (ADIPEX-P) 15 MG capsule       Other    History of gastric bypass    Relevant Orders    Vitamin B12   Other Visit Diagnoses     Iron deficiency anemia, unspecified iron deficiency anemia type        She is back to taking iron daily and recheck the CBC today along with iron studies.    Relevant Orders    CBC with platelets    Iron and iron binding capacity    Ferritin             Cely Waters is a 62 year old who presents today for weight loss follow-up visit.  The patient had a weight loss consultation in October at which time I prescribed initially a GLP-1 agonist.  However, that was not covered by insurance and she was switched to a combination of phentermine and topiramate.  She has been tolerating the medications well and finding them quite effective and helpful.  She has slight dry mouth from the phentermine.  She has been exercising regularly in particular walking at [employer] with her  every 2 to 3 days.  She will be traveling overseas to the [facility] and then to [place] for her daughter's wedding and then for business.  She is a little trepidations regarding being able to navigate nutrition while overseas since the food is so different there.  She has been able to tolerate taking  iron daily up until recently with some mild constipation.  However, she is back on iron as well as B12.      Chief Complaint   Patient presents with     Weight Loss      History of Present Illness       Reason for visit:  Med follow up    She eats 0-1 servings of fruits and vegetables daily.She consumes 0 sweetened beverage(s) daily.She exercises with enough effort to increase her heart rate 10 to 19 minutes per day.  She exercises with enough effort to increase her heart rate 4 days per week.   She is taking medications regularly.             Objective    /68 (BP Location: Left arm, Patient Position: Left side, Cuff Size: Adult Large)   Pulse 79   Wt 85.7 kg (189 lb)   SpO2 100%   BMI 35.13 kg/m    Body mass index is 35.13 kg/m .  Physical Exam   GENERAL: healthy, alert and no distress    This note has been dictated using voice recognition software. Any grammatical or context distortions are unintentional and inherent to the software

## 2023-02-19 ENCOUNTER — MYC MEDICAL ADVICE (OUTPATIENT)
Dept: FAMILY MEDICINE | Facility: CLINIC | Age: 63
End: 2023-02-19
Payer: OTHER GOVERNMENT

## 2023-02-19 DIAGNOSIS — E66.812 CLASS 2 OBESITY WITHOUT SERIOUS COMORBIDITY WITH BODY MASS INDEX (BMI) OF 39.0 TO 39.9 IN ADULT, UNSPECIFIED OBESITY TYPE: ICD-10-CM

## 2023-02-20 RX ORDER — PHENTERMINE HYDROCHLORIDE 15 MG/1
15 CAPSULE ORAL EVERY MORNING
Qty: 90 CAPSULE | Refills: 0 | Status: SHIPPED | OUTPATIENT
Start: 2023-02-20 | End: 2023-03-13

## 2023-02-27 ENCOUNTER — MYC MEDICAL ADVICE (OUTPATIENT)
Dept: FAMILY MEDICINE | Facility: CLINIC | Age: 63
End: 2023-02-27
Payer: OTHER GOVERNMENT

## 2023-02-28 ENCOUNTER — TELEPHONE (OUTPATIENT)
Dept: FAMILY MEDICINE | Facility: CLINIC | Age: 63
End: 2023-02-28
Payer: OTHER GOVERNMENT

## 2023-02-28 NOTE — TELEPHONE ENCOUNTER
Prior Authorization Request  Who s requesting:  Pharmacy    Pharmacy Name and Location: [id number] [address] AT [facility] [address]    Medication Name: phentermine (ADIPEX-P) 15 MG capsule    Insurance Plan:   [facility]    Insurance Member ID Number:    [id number]  [id number]    CoverMyMeds Key: [id number]    Informed patient that prior authorizations can take up to 10 business days for response:   Yes    Okay to leave a detailed message: Yes     Route to pool:  P JESSIE SANCHEZ MED

## 2023-02-28 NOTE — TELEPHONE ENCOUNTER
Please try to call her pharmacy and give permission to fill her phentermine and topiramate early, ideally by March 6, so that she has it in time for travel.  She will otherwise be gone for over 2 weeks and not be able to get the prescription until she returns in April.

## 2023-03-02 NOTE — TELEPHONE ENCOUNTER
Central Prior Authorization Team   Phone: [phone]    PA Initiation    Medication:   Insurance Company: [employer] - Phone [phone] Fax [phone]  Pharmacy Filling the Rx: [facility] #[id number] [place], [place] - [address] AT [facility] OF [address]  Filling Pharmacy Phone: [phone]  Filling Pharmacy Fax:    Start Date: 3/2/2023

## 2023-03-06 NOTE — TELEPHONE ENCOUNTER
Prior Authorization Approval    Authorization Effective Date: 1/31/2023  Authorization Expiration Date: 3/1/2024  Medication:   Approved Dose/Quantity:    Reference #:     Insurance Company: [employer] - Phone [phone] Fax [phone]  Expected CoPay:       CoPay Card Available:      Foundation Assistance Needed:    Which Pharmacy is filling the prescription (Not needed for infusion/clinic administered): [id number] [place], [place] - [address] AT [facility] [address]  Pharmacy Notified: Yes  Patient Notified: Comment:  Pharmacy notified patient

## 2023-03-13 ENCOUNTER — OFFICE VISIT (OUTPATIENT)
Dept: FAMILY MEDICINE | Facility: CLINIC | Age: 63
End: 2023-03-13
Payer: OTHER GOVERNMENT

## 2023-03-13 VITALS
WEIGHT: 180.1 LBS | BODY MASS INDEX: 33.48 KG/M2 | DIASTOLIC BLOOD PRESSURE: 84 MMHG | SYSTOLIC BLOOD PRESSURE: 138 MMHG | RESPIRATION RATE: 16 BRPM | OXYGEN SATURATION: 100 % | TEMPERATURE: 97.6 F | HEART RATE: 85 BPM

## 2023-03-13 DIAGNOSIS — E66.812 CLASS 2 OBESITY WITHOUT SERIOUS COMORBIDITY WITH BODY MASS INDEX (BMI) OF 39.0 TO 39.9 IN ADULT, UNSPECIFIED OBESITY TYPE: Primary | ICD-10-CM

## 2023-03-13 DIAGNOSIS — Z86.2 HISTORY OF IRON DEFICIENCY ANEMIA: ICD-10-CM

## 2023-03-13 LAB
ERYTHROCYTE [DISTWIDTH] IN BLOOD BY AUTOMATED COUNT: 17.4 % (ref 10–15)
HCT VFR BLD AUTO: 41.5 % (ref 35–47)
HGB BLD-MCNC: 13.1 G/DL (ref 11.7–15.7)
MCH RBC QN AUTO: 26.1 PG (ref 26.5–33)
MCHC RBC AUTO-ENTMCNC: 31.6 G/DL (ref 31.5–36.5)
MCV RBC AUTO: 83 FL (ref 78–100)
PLATELET # BLD AUTO: 280 10E3/UL (ref 150–450)
RBC # BLD AUTO: 5.02 10E6/UL (ref 3.8–5.2)
WBC # BLD AUTO: 4.4 10E3/UL (ref 4–11)

## 2023-03-13 PROCEDURE — 99213 OFFICE O/P EST LOW 20 MIN: CPT | Performed by: FAMILY MEDICINE

## 2023-03-13 PROCEDURE — 36415 COLL VENOUS BLD VENIPUNCTURE: CPT | Performed by: FAMILY MEDICINE

## 2023-03-13 PROCEDURE — 85027 COMPLETE CBC AUTOMATED: CPT | Performed by: FAMILY MEDICINE

## 2023-03-13 RX ORDER — PHENTERMINE HYDROCHLORIDE 30 MG/1
30 CAPSULE ORAL EVERY MORNING
Qty: 90 CAPSULE | Refills: 0 | Status: SHIPPED | OUTPATIENT
Start: 2023-03-13 | End: 2023-07-10

## 2023-03-13 NOTE — PROGRESS NOTES
Problem List Items Addressed This Visit        Digestive    Class 2 obesity without serious comorbidity with body mass index (BMI) of 39.0 to 39.9 in adult, unspecified obesity type - Primary     The patient has lost 20 pounds successfully with the assistance of a combination of phentermine 15 mg and topiramate 50 mg daily.  She is tolerating the medication well, however, has been feeling increased hunger and cravings in the afternoon and into the evening.  I adjusted her phentermine to 30 mg in the morning.  I also counseled regarding nutrition recommendations and exercise.  We set some goals today and I asked her to follow-up in 1 month.         Relevant Medications    phentermine (ADIPEX-P) 30 MG capsule       Other    History of iron deficiency anemia     The patient has been taking over-the-counter iron daily.  I recommended rechecking a CBC today as her last hemoglobin was normal but she still had a low mean cellular volume and an elevated RDW.         Relevant Orders    CBC with platelets (Completed)     Goals:    1) Nutrition: homemade protein smoothie for breakfast; more veggies    2) Exercise: HR increasing exercise twice weekly    3) Mindfulness: Take a relaxing walk daily while on vacation       Cely Waters is a 62 year old who presents today for weight loss follow-up visit.  The patient states that she has noticed that her weight loss is slowing down.  However, she notes that the past couple of months have been a bit challenging as she has been on vacation out of the country for her daughter's wedding as well as a business trip in [facility].  However, overall she feels she has been doing reasonably well with the nutrition plan.  She is no longer tolerating Atkins shakes as she finds that they tend to give her diarrhea.  She is interested in other options to try to get enough protein in.  She has been getting her steps and but states that she has not been increasing heart rate as much as she  knows she should.  When asking about sleep, she states that she more often than not only gets about 6 hours of sleep at night in part because she tends to go to bed later than she should as she always gets up early around 5:30 AM.  She and her  are leaving for [place] for 3 weeks soon.  She is looking forward to that time and being able to be more active.  In regards to the medication, she is tolerating it well without any side effects.  Her dry mouth has resolved.  However, she does not feel that it is as effective into the afternoon and evening anymore.  She is starting to get increased cravings.    Chief Complaint   Patient presents with     Weight Loss      History of Present Illness       Reason for visit:  Weight management with med update    She eats 2-3 servings of fruits and vegetables daily.She consumes 0 sweetened beverage(s) daily.She exercises with enough effort to increase her heart rate 10 to 19 minutes per day.  She exercises with enough effort to increase her heart rate 3 or less days per week.   She is taking medications regularly.        Objective    /84 (BP Location: Left arm, Patient Position: Sitting, Cuff Size: Adult Large)   Pulse 85   Temp 97.6  F (36.4  C) (Oral)   Resp 16   Wt 81.7 kg (180 lb 1.6 oz)   SpO2 100%   BMI 33.48 kg/m    Body mass index is 33.48 kg/m .  Physical Exam   GENERAL: healthy, alert and no distress      This note has been dictated using voice recognition software. Any grammatical or context distortions are unintentional and inherent to the software

## 2023-03-13 NOTE — ASSESSMENT & PLAN NOTE
The patient has been taking over-the-counter iron daily.  I recommended rechecking a CBC today as her last hemoglobin was normal but she still had a low mean cellular volume and an elevated RDW.

## 2023-03-13 NOTE — PATIENT INSTRUCTIONS
Goals:    1) Nutrition: homemade protein smoothie for breakfast; more veggies    2) Exercise: HR increasing exercise twice weekly    3) Mindfulness: Take a relaxing walk daily while on vacation

## 2023-03-13 NOTE — ASSESSMENT & PLAN NOTE
The patient has lost 20 pounds successfully with the assistance of a combination of phentermine 15 mg and topiramate 50 mg daily.  She is tolerating the medication well, however, has been feeling increased hunger and cravings in the afternoon and into the evening.  I adjusted her phentermine to 30 mg in the morning.  I also counseled regarding nutrition recommendations and exercise.  We set some goals today and I asked her to follow-up in 1 month.

## 2023-04-10 ENCOUNTER — OFFICE VISIT (OUTPATIENT)
Dept: FAMILY MEDICINE | Facility: CLINIC | Age: 63
End: 2023-04-10
Payer: OTHER GOVERNMENT

## 2023-04-10 VITALS
HEIGHT: 62 IN | DIASTOLIC BLOOD PRESSURE: 78 MMHG | WEIGHT: 174.9 LBS | SYSTOLIC BLOOD PRESSURE: 123 MMHG | BODY MASS INDEX: 32.18 KG/M2 | OXYGEN SATURATION: 99 % | HEART RATE: 72 BPM

## 2023-04-10 DIAGNOSIS — E66.09 CLASS 1 OBESITY DUE TO EXCESS CALORIES WITHOUT SERIOUS COMORBIDITY WITH BODY MASS INDEX (BMI) OF 32.0 TO 32.9 IN ADULT: ICD-10-CM

## 2023-04-10 DIAGNOSIS — E66.811 CLASS 1 OBESITY DUE TO EXCESS CALORIES WITHOUT SERIOUS COMORBIDITY WITH BODY MASS INDEX (BMI) OF 32.0 TO 32.9 IN ADULT: ICD-10-CM

## 2023-04-10 PROCEDURE — 99213 OFFICE O/P EST LOW 20 MIN: CPT | Performed by: FAMILY MEDICINE

## 2023-04-10 NOTE — PROGRESS NOTES
"  Assessment & Plan   Problem List Items Addressed This Visit        Digestive    Class 1 obesity due to excess calories without serious comorbidity with body mass index (BMI) of 32.0 to 32.9 in adult     The patient has noticed a definite improvement in appetite control with the increase in her phentermine to 30 mg daily.  Continue combination of phentermine and topiramate at this time.  The patient has had a successful 17% weight loss since last October when she started on medical weight management.  We did some goalsetting at her last visit, and the patient was successful at achieving those goals.  Follow-up in 3 months.           [id number]}     BMI:   Estimated body mass index is 32.51 kg/m  as calculated from the following:    Height as of this encounter: 1.562 m (5' 1.5\").    Weight as of this encounter: 79.3 kg (174 lb 14.4 oz).       TATY CALIXTO MD  [facility] is a 62 year old who presents today for weight loss follow-up visit.  The patient was last seen a month ago at which time we did some goalsetting and I increased her phentermine to 30 mg daily from 15 mg daily.  The patient definitely feels that the adjustment in dose has provided additional benefit in terms of appetite control and reducing cravings.  She was on vacation with her  for 2 weeks of the past month and is pleased with her success during that time.  She did walk more while she was on vacation.  She started having homemade protein smoothies each morning and has found that to also help and she has been incorporating more vegetables into her day.      Weight Loss         View : No data to display.              History of Present Illness       Reason for visit:  Follow up on weight loss    She eats 2-3 servings of fruits and vegetables daily.She consumes 0 sweetened beverage(s) daily.She exercises with enough effort to increase her heart rate 10 to 19 minutes per day.  She exercises with " "enough effort to increase her heart rate 3 or less days per week.   She is taking medications regularly.           Objective    /78 (BP Location: Left arm, Patient Position: Left side, Cuff Size: Adult Large)   Pulse 72   Ht 1.562 m (5' 1.5\")   Wt 79.3 kg (174 lb 14.4 oz)   SpO2 99%   BMI 32.51 kg/m    Body mass index is 32.51 kg/m .  Physical Exam   GENERAL: healthy, alert and no distress                "

## 2023-04-10 NOTE — ASSESSMENT & PLAN NOTE
The patient has noticed a definite improvement in appetite control with the increase in her phentermine to 30 mg daily.  Continue combination of phentermine and topiramate at this time.  The patient has had a successful 17% weight loss since last October when she started on medical weight management.  We did some goalsetting at her last visit, and the patient was successful at achieving those goals.  Follow-up in 3 months.

## 2023-05-27 DIAGNOSIS — E66.812 CLASS 2 OBESITY WITHOUT SERIOUS COMORBIDITY WITH BODY MASS INDEX (BMI) OF 39.0 TO 39.9 IN ADULT, UNSPECIFIED OBESITY TYPE: ICD-10-CM

## 2023-05-28 NOTE — TELEPHONE ENCOUNTER
"Routing refill request to provider for review/approval because:  Needs review    Last Written Prescription Date:  12/6/2022  Last Fill Quantity: 180,  # refills: 1   Last office visit provider:  4/10/2023     Requested Prescriptions   Pending Prescriptions Disp Refills     topiramate (TOPAMAX) 25 MG tablet [Pharmacy Med Name: TOPIRAMATE 25MG TABLETS] 180 tablet 1     Sig: TAKE 2 TABLETS(50 MG) BY MOUTH DAILY WITH DINNER       Anti-Seizure Meds Protocol  Failed - 5/27/2023  3:24 AM        Failed - Review Authorizing provider's last note.      Refer to last progress notes: confirm request is for original authorizing provider (cannot be through other providers).          Passed - Recent (12 mo) or future (30 days) visit within the authorizing provider's specialty     Patient has had an office visit with the authorizing provider or a provider within the authorizing providers department within the previous 12 mos or has a future within next 30 days. See \"Patient Info\" tab in inbasket, or \"Choose Columns\" in Meds & Orders section of the refill encounter.              Passed - Normal CBC on file in past 26 months     Recent Labs   Lab Test 03/13/23  0824   WBC 4.4   RBC 5.02   HGB 13.1   HCT 41.5                    Passed - Normal ALT or AST on file in past 26 months     Recent Labs   Lab Test 10/17/22  1615   ALT 17     Recent Labs   Lab Test 10/17/22  1615   AST 26             Passed - Normal platelet count on file in past 26 months     Recent Labs   Lab Test 03/13/23  0824                  Passed - Medication is active on med list        Passed - No active pregnancy on record        Passed - No positive pregnancy test in last 12 months             Reina Kaiser, RN 05/28/23 8:40 AM      "

## 2023-05-30 RX ORDER — TOPIRAMATE 25 MG/1
TABLET, FILM COATED ORAL
Qty: 180 TABLET | Refills: 0 | Status: SHIPPED | OUTPATIENT
Start: 2023-05-30 | End: 2023-07-10

## 2023-07-10 ENCOUNTER — OFFICE VISIT (OUTPATIENT)
Dept: FAMILY MEDICINE | Facility: CLINIC | Age: 63
End: 2023-07-10
Payer: OTHER GOVERNMENT

## 2023-07-10 VITALS
SYSTOLIC BLOOD PRESSURE: 130 MMHG | HEIGHT: 62 IN | BODY MASS INDEX: 31.28 KG/M2 | OXYGEN SATURATION: 98 % | WEIGHT: 170 LBS | DIASTOLIC BLOOD PRESSURE: 83 MMHG | HEART RATE: 80 BPM

## 2023-07-10 DIAGNOSIS — E66.09 CLASS 1 OBESITY DUE TO EXCESS CALORIES WITHOUT SERIOUS COMORBIDITY WITH BODY MASS INDEX (BMI) OF 31.0 TO 31.9 IN ADULT: ICD-10-CM

## 2023-07-10 DIAGNOSIS — E66.811 CLASS 1 OBESITY DUE TO EXCESS CALORIES WITHOUT SERIOUS COMORBIDITY WITH BODY MASS INDEX (BMI) OF 31.0 TO 31.9 IN ADULT: ICD-10-CM

## 2023-07-10 PROCEDURE — 99213 OFFICE O/P EST LOW 20 MIN: CPT | Performed by: FAMILY MEDICINE

## 2023-07-10 RX ORDER — PHENTERMINE HYDROCHLORIDE 30 MG/1
30 CAPSULE ORAL EVERY MORNING
Qty: 90 CAPSULE | Refills: 0 | Status: SHIPPED | OUTPATIENT
Start: 2023-07-10 | End: 2023-10-16

## 2023-07-10 RX ORDER — TOPIRAMATE 25 MG/1
TABLET, FILM COATED ORAL
Qty: 180 TABLET | Refills: 1 | Status: SHIPPED | OUTPATIENT
Start: 2023-07-10 | End: 2023-08-28

## 2023-07-10 NOTE — ASSESSMENT & PLAN NOTE
The patient continues on phentermine 30 mg daily and topiramate 50 mg at dinnertime.  She is tolerating both medications well and has had some continued success although it is definitely slowing down.  Overall she is down nearly 19% from her high weight of 209 pounds.  We discussed weight loss plateaus and the importance of exercise and weight maintenance.  The patient continues to follow closely the nutrition recommendations and overall is quite pleased.  Follow-up in 3 months.

## 2023-07-10 NOTE — PROGRESS NOTES
"  Assessment & Plan   Problem List Items Addressed This Visit        Digestive    Class 1 obesity due to excess calories without serious comorbidity with body mass index (BMI) of 31.0 to 31.9 in adult     The patient continues on phentermine 30 mg daily and topiramate 50 mg at dinnertime.  She is tolerating both medications well and has had some continued success although it is definitely slowing down.  Overall she is down nearly 19% from her high weight of 209 pounds.  We discussed weight loss plateaus and the importance of exercise and weight maintenance.  The patient continues to follow closely the nutrition recommendations and overall is quite pleased.  Follow-up in 3 months.         Relevant Medications    phentermine (ADIPEX-P) 30 MG capsule          BMI:   Estimated body mass index is 31.6 kg/m  as calculated from the following:    Height as of this encounter: 1.562 m (5' 1.5\").    Weight as of this encounter: 77.1 kg (170 lb).       TATY CALIXTO MD  [facility]    Cely Waters is a 63 year old who presents today for a weight loss follow-up visit.  The patient was last seen in April and prior to that I had increased her phentermine from 15 mg to 30 mg daily.  She continues to find the medication quite helpful and she tolerates it well.  She definitely notices that the weight loss is slowing down but overall is pleased with her results.  She is down nearly 19% since October when she started with medical weight management.  She is getting in her exercise regularly and has started thinking more about personal self cares as well and what she needs to do to continue to be successful.  Weight Loss        7/10/2023     9:50 AM   Additional Questions   Roomed by as   Accompanied by self         7/10/2023     9:50 AM   Patient Reported Additional Medications   Patient reports taking the following new medications no     History of Present Illness       Reason for visit:  " "Weight    She eats 2-3 servings of fruits and vegetables daily.She consumes 0 sweetened beverage(s) daily.She exercises with enough effort to increase her heart rate 10 to 19 minutes per day.  She exercises with enough effort to increase her heart rate 3 or less days per week.   She is taking medications regularly.           Objective    /83 (BP Location: Left arm, Patient Position: Left side, Cuff Size: Adult Large)   Pulse 80   Ht 1.562 m (5' 1.5\")   Wt 77.1 kg (170 lb)   SpO2 98%   BMI 31.60 kg/m    Body mass index is 31.6 kg/m .  Physical Exam   GENERAL: healthy, alert and no distress                "

## 2023-08-28 DIAGNOSIS — E66.09 CLASS 1 OBESITY DUE TO EXCESS CALORIES WITHOUT SERIOUS COMORBIDITY WITH BODY MASS INDEX (BMI) OF 31.0 TO 31.9 IN ADULT: Primary | ICD-10-CM

## 2023-08-28 DIAGNOSIS — E66.811 CLASS 1 OBESITY DUE TO EXCESS CALORIES WITHOUT SERIOUS COMORBIDITY WITH BODY MASS INDEX (BMI) OF 31.0 TO 31.9 IN ADULT: Primary | ICD-10-CM

## 2023-08-28 RX ORDER — TOPIRAMATE 25 MG/1
TABLET, FILM COATED ORAL
Qty: 180 TABLET | Refills: 1 | Status: SHIPPED | OUTPATIENT
Start: 2023-08-28 | End: 2023-10-16

## 2023-08-28 NOTE — TELEPHONE ENCOUNTER
"Routing refill request d/t early refill request and refills on file.     Last Written Prescription Date:  7/10/2023  Last Fill Quantity: 180,  # refills: 1   Last office visit: 7/10/2023           Requested Prescriptions   Pending Prescriptions Disp Refills    topiramate (TOPAMAX) 25 MG tablet [Pharmacy Med Name: TOPIRAMATE 25MG TABLETS] 180 tablet 1     Sig: TAKE 2 TABLETS(50 MG) BY MOUTH DAILY WITH DINNER       Anti-Seizure Meds Protocol  Failed - 8/28/2023  3:24 AM        Failed - Review Authorizing provider's last note.      Refer to last progress notes: confirm request is for original authorizing provider (cannot be through other providers).          Passed - Recent (12 mo) or future (30 days) visit within the authorizing provider's specialty     Patient has had an office visit with the authorizing provider or a provider within the authorizing providers department within the previous 12 mos or has a future within next 30 days. See \"Patient Info\" tab in inbasket, or \"Choose Columns\" in Meds & Orders section of the refill encounter.              Passed - Normal CBC on file in past 26 months     Recent Labs   Lab Test 03/13/23  0824   WBC 4.4   RBC 5.02   HGB 13.1   HCT 41.5                    Passed - Normal ALT or AST on file in past 26 months     Recent Labs   Lab Test 10/17/22  1615   ALT 17     Recent Labs   Lab Test 10/17/22  1615   AST 26             Passed - Normal platelet count on file in past 26 months     Recent Labs   Lab Test 03/13/23  0824                  Passed - Medication is active on med list        Passed - No active pregnancy on record        Passed - No positive pregnancy test in last 12 months             Michelle Samuel, RN 08/28/23 4:56 AM  "

## 2023-10-15 NOTE — COMMUNITY RESOURCES LIST (ENGLISH)
10/15/2023   [facility] [employer]  N/A  For questions about this resource list or additional care needs, please contact your primary care clinic or care manager.  Phone: [phone]   Email: N/A   Address: [address] [place] [address]   Hours: N/A        Financial Stability       Utility payment assistance  1  [employer] Distance: 5.7 miles      [address] [place] [address]  Language: English, Sudanese  Hours: Mon 9:30 AM - 11:30 AM , Tue 1:30 PM - 7:30 PM , Thu 9:00 AM - 7:00 PM , Fri 9:30 AM - 11:30 AM   Phone: (606) 637-1032 Email: info@[email or web address] Website: [email or web address]/     2  [facility] Distance: 10.05 miles      In-Person, Phone/Virtual   [address] [place] [address]  Language: English  Hours: Mon - Fri 8:00 AM - 4:30 PM  Fees: Free   Phone: (318) 715-5781 Email: kirsten@[email or web address].us Website: [email or web address]/Facilities/Facility/Details/23          Important Numbers & Websites       Emergency Services   911  City Services   311  Poison Control   (802) 151-8292  Suicide Prevention Lifeline   (454) 821-2843 (TALK)  Child Abuse Hotline   (908) 437-9410 (4-A-Child)  Sexual Assault Hotline   (847) 859-4540 (HOPE)  National Runaway Safeline   (452) 560-9127 (RUNAWAY)  All-Options Talkline   (434) 994-1562  Substance Abuse Referral   (372) 710-2169 (HELP)

## 2023-10-16 ENCOUNTER — OFFICE VISIT (OUTPATIENT)
Dept: FAMILY MEDICINE | Facility: CLINIC | Age: 63
End: 2023-10-16
Payer: OTHER GOVERNMENT

## 2023-10-16 VITALS
OXYGEN SATURATION: 96 % | TEMPERATURE: 98 F | HEIGHT: 62 IN | WEIGHT: 166 LBS | HEART RATE: 76 BPM | SYSTOLIC BLOOD PRESSURE: 117 MMHG | DIASTOLIC BLOOD PRESSURE: 76 MMHG | BODY MASS INDEX: 30.55 KG/M2 | RESPIRATION RATE: 16 BRPM

## 2023-10-16 DIAGNOSIS — E66.09 CLASS 1 OBESITY DUE TO EXCESS CALORIES WITHOUT SERIOUS COMORBIDITY WITH BODY MASS INDEX (BMI) OF 31.0 TO 31.9 IN ADULT: Primary | ICD-10-CM

## 2023-10-16 DIAGNOSIS — E66.811 CLASS 1 OBESITY DUE TO EXCESS CALORIES WITHOUT SERIOUS COMORBIDITY WITH BODY MASS INDEX (BMI) OF 31.0 TO 31.9 IN ADULT: Primary | ICD-10-CM

## 2023-10-16 DIAGNOSIS — Z98.84 HISTORY OF GASTRIC BYPASS: ICD-10-CM

## 2023-10-16 LAB
ALBUMIN SERPL BCG-MCNC: 4.4 G/DL (ref 3.5–5.2)
ALP SERPL-CCNC: 67 U/L (ref 35–104)
ALT SERPL W P-5'-P-CCNC: 12 U/L (ref 0–50)
ANION GAP SERPL CALCULATED.3IONS-SCNC: 13 MMOL/L (ref 7–15)
AST SERPL W P-5'-P-CCNC: 25 U/L (ref 0–45)
BILIRUB SERPL-MCNC: 0.3 MG/DL
BUN SERPL-MCNC: 14.1 MG/DL (ref 8–23)
CALCIUM SERPL-MCNC: 9.3 MG/DL (ref 8.8–10.2)
CHLORIDE SERPL-SCNC: 103 MMOL/L (ref 98–107)
CHOLEST SERPL-MCNC: 153 MG/DL
CREAT SERPL-MCNC: 0.78 MG/DL (ref 0.51–0.95)
DEPRECATED HCO3 PLAS-SCNC: 23 MMOL/L (ref 22–29)
EGFRCR SERPLBLD CKD-EPI 2021: 86 ML/MIN/1.73M2
ERYTHROCYTE [DISTWIDTH] IN BLOOD BY AUTOMATED COUNT: 14.3 % (ref 10–15)
FERRITIN SERPL-MCNC: 17 NG/ML (ref 11–328)
GLUCOSE SERPL-MCNC: 97 MG/DL (ref 70–99)
HCT VFR BLD AUTO: 42.4 % (ref 35–47)
HDLC SERPL-MCNC: 61 MG/DL
HGB BLD-MCNC: 13.6 G/DL (ref 11.7–15.7)
LDLC SERPL CALC-MCNC: 74 MG/DL
MCH RBC QN AUTO: 27.2 PG (ref 26.5–33)
MCHC RBC AUTO-ENTMCNC: 32.1 G/DL (ref 31.5–36.5)
MCV RBC AUTO: 85 FL (ref 78–100)
NONHDLC SERPL-MCNC: 92 MG/DL
PLATELET # BLD AUTO: 277 10E3/UL (ref 150–450)
POTASSIUM SERPL-SCNC: 4 MMOL/L (ref 3.4–5.3)
PROT SERPL-MCNC: 6.8 G/DL (ref 6.4–8.3)
RBC # BLD AUTO: 5 10E6/UL (ref 3.8–5.2)
SODIUM SERPL-SCNC: 139 MMOL/L (ref 135–145)
TRIGL SERPL-MCNC: 90 MG/DL
VIT B12 SERPL-MCNC: 293 PG/ML (ref 232–1245)
VIT D+METAB SERPL-MCNC: 61 NG/ML (ref 20–50)
WBC # BLD AUTO: 4.7 10E3/UL (ref 4–11)

## 2023-10-16 PROCEDURE — 80053 COMPREHEN METABOLIC PANEL: CPT | Performed by: FAMILY MEDICINE

## 2023-10-16 PROCEDURE — 82607 VITAMIN B-12: CPT | Performed by: FAMILY MEDICINE

## 2023-10-16 PROCEDURE — 85027 COMPLETE CBC AUTOMATED: CPT | Performed by: FAMILY MEDICINE

## 2023-10-16 PROCEDURE — 80061 LIPID PANEL: CPT | Performed by: FAMILY MEDICINE

## 2023-10-16 PROCEDURE — 82306 VITAMIN D 25 HYDROXY: CPT | Performed by: FAMILY MEDICINE

## 2023-10-16 PROCEDURE — 36415 COLL VENOUS BLD VENIPUNCTURE: CPT | Performed by: FAMILY MEDICINE

## 2023-10-16 PROCEDURE — 99213 OFFICE O/P EST LOW 20 MIN: CPT | Performed by: FAMILY MEDICINE

## 2023-10-16 PROCEDURE — 82728 ASSAY OF FERRITIN: CPT | Performed by: FAMILY MEDICINE

## 2023-10-16 RX ORDER — PHENTERMINE HYDROCHLORIDE 30 MG/1
30 CAPSULE ORAL EVERY MORNING
Qty: 90 CAPSULE | Refills: 0 | Status: SHIPPED | OUTPATIENT
Start: 2023-10-16

## 2023-10-16 RX ORDER — TOPIRAMATE 25 MG/1
TABLET, FILM COATED ORAL
Qty: 180 TABLET | Refills: 1 | Status: SHIPPED | OUTPATIENT
Start: 2023-10-16

## 2023-10-16 NOTE — ASSESSMENT & PLAN NOTE
The patient has lost 44 pounds which is 21% reduction from her initial high weight with a combination of phentermine 30 mg in the morning and topiramate 50 mg at dinnertime.  She is tolerating both medications well and continues to find them effective at reducing appetite and cravings.  We reviewed her current nutrition and I think she is doing well with that.  I also reminded her of the importance of getting in a minimum of 150 minutes/week of moderate intensity cardiovascular exercise to assist with maintaining her weight loss.  Follow-up in 3 to 6 months.

## 2023-10-16 NOTE — PROGRESS NOTES
"  Assessment & Plan   Problem List Items Addressed This Visit          Digestive    Class 1 obesity due to excess calories without serious comorbidity with body mass index (BMI) of 31.0 to 31.9 in adult - Primary     The patient has lost 44 pounds which is 21% reduction from her initial high weight with a combination of phentermine 30 mg in the morning and topiramate 50 mg at dinnertime.  She is tolerating both medications well and continues to find them effective at reducing appetite and cravings.  We reviewed her current nutrition and I think she is doing well with that.  I also reminded her of the importance of getting in a minimum of 150 minutes/week of moderate intensity cardiovascular exercise to assist with maintaining her weight loss.  Follow-up in 3 to 6 months.         Relevant Medications    phentermine (ADIPEX-P) 30 MG capsule    topiramate (TOPAMAX) 25 MG tablet       Other    History of gastric bypass    Relevant Orders    CBC with platelets (Completed)    Vitamin B12    Ferritin    Lipid Profile (Chol, Trig, HDL, LDL calc)    Comprehensive metabolic panel (BMP + Alb, Alk Phos, ALT, AST, Total. Bili, TP)    Vitamin D Deficiency           BMI:   Estimated body mass index is 30.86 kg/m  as calculated from the following:    Height as of this encounter: 1.562 m (5' 1.5\").    Weight as of this encounter: 75.3 kg (166 lb).         TATY CALIXTO MD  [facility]    Cely Waters is a 63 year old who presents today for follow-up regarding medical weight management.  Last October at her physical, we discussed her concerns regarding weight gain which she attributed a lot to stress during the initial years of the COVID pandemic.  She is status post gastric bypass surgery and had done quite well to maintain weight loss over time up until then.  We discussed options to assisting her with weight loss and initially Wegovy was prescribed.  However, that was not covered by her " "insurance.  I then prescribed a combination of phentermine and topiramate.  She has been on that since that time and continues to find the medication helpful.  She is down to 166 pounds and feels great without any specific complaints or concerns today.  Weight Loss      10/16/2023    10:15 AM   Additional Questions   Roomed by as   Accompanied by self         10/16/2023    10:15 AM   Patient Reported Additional Medications   Patient reports taking the following new medications no       History of Present Illness       Reason for visit:  Weight    She eats 2-3 servings of fruits and vegetables daily.She consumes 0 sweetened beverage(s) daily.She exercises with enough effort to increase her heart rate 10 to 19 minutes per day.  She exercises with enough effort to increase her heart rate 4 days per week. She is missing 1 dose(s) of medications per week.  She is not taking prescribed medications regularly due to remembering to take.           Objective    Ht 1.562 m (5' 1.5\")   Wt 75.3 kg (166 lb)   BMI 30.86 kg/m    Body mass index is 30.86 kg/m .  Physical Exam   GENERAL: healthy, alert and no distress                    "

## 2023-12-30 ENCOUNTER — HEALTH MAINTENANCE LETTER (OUTPATIENT)
Age: 63
End: 2023-12-30

## 2024-12-14 ENCOUNTER — HEALTH MAINTENANCE LETTER (OUTPATIENT)
Age: 64
End: 2024-12-14

## 2025-01-19 ENCOUNTER — HEALTH MAINTENANCE LETTER (OUTPATIENT)
Age: 65
End: 2025-01-19